# Patient Record
Sex: FEMALE | Race: WHITE | Employment: FULL TIME | URBAN - METROPOLITAN AREA
[De-identification: names, ages, dates, MRNs, and addresses within clinical notes are randomized per-mention and may not be internally consistent; named-entity substitution may affect disease eponyms.]

---

## 2017-02-08 LAB
CHLAMYDIA, EXTERNAL: NEGATIVE
HBSAG, EXTERNAL: NEGATIVE
HIV, EXTERNAL: NON REACTIVE
N. GONORRHEA, EXTERNAL: NEGATIVE
RUBELLA, EXTERNAL: NORMAL
TYPE, ABO & RH, EXTERNAL: NORMAL

## 2017-06-29 LAB — ANTIBODY SCREEN, EXTERNAL: NEGATIVE

## 2017-08-29 LAB — GRBS, EXTERNAL: NEGATIVE

## 2017-09-18 ENCOUNTER — ANESTHESIA EVENT (OUTPATIENT)
Dept: LABOR AND DELIVERY | Age: 32
End: 2017-09-18
Payer: COMMERCIAL

## 2017-09-18 NOTE — H&P
EDC:2017  EGA: 39 weeks, 5 days      Vital Signs   27Years Old Female  Weight: 191 pounds  BP:       130/80    Pap/HPV/Gardasil History   History of abnormal pap: yes  Gardasil Injection History: Not Applicable    Patient's Prenatal Care with Doctor of Record Kelsey Guzmán MD Notable For -    Previous IUGR pregnant, 3rd tri growth (36% at 26 weeks)  Prev LTCS, undecided re delivery mode,  consent __x__   lab screening  Normal pregnancy multigravida  MRSA Pregnant-1st Trim Cult_Positive, tx->NEG!, 3rd Trim Cult (POS), retest (NEG)  pap neg, hpv pos neg 16/18 in 3/2016, repeat 3/2017__              Past Pregnancy History      :  3     Term Births:  1     Premature Births: 0     Living Children: 1     Para:   1     Mult. Births:  0     Prev : 0     Aborta:  1     Elect. Ab:  0     Spont. Ab:  1     Ectopics:  0    Pregnancy # 1     Delivery date:   04/10/2014     Weeks Gestation: 37 4/7     Delivery type:   LTCS     Delivery location[facility]   [facility]     Infant Sex:  Female     Name:  Jennifer Bustos     Comments:  [address] [facility] for fetal intolerance to labor/non reassuring fetal surveillance, cervidil induction due to IUGR-->pitocin    Pregnancy # 2     Delivery type:   SAB     Comments:  early SAB, no procedures        Allergies    This patient has no known allergies. Medications Removed from Medication List        [address] [place] [address] for Follow-up Visit     Estimated weeks of        gestation:  39 5/7     Weight:  191     Blood pressure: 130 / 80     Urine Protein:  Tr     Urine Glucose: N     Headache:  No     Nausea/vomiting: No     Edema: TrLE     Vaginal bleeding: no     Vaginal discharge: no     Fetal activity:  yes     FHR:   150     Labor symptoms: no     Smoking:  n/a     Next visit:  1 d     Preceptor:  cpm     Flu:  accepted     Tim Bert:  denies exposure     Comment:  repeat mrsa neg! has decided on repeat c/s tomorrow.  flu vaccine today        Impression & Recommendations:    Problem # 1:  Prev LTCS, undecided re delivery mode,  consent __x__ (NUH-007.75) (MXS49-J39.211)  Has decided to proceed with repeat Low Transverse  Section  scheduled tomorrow  Reviewed risks of bleeding, infection, damage to surrounding structures including bowel, bladder, need for additional procedures, dvt. After discussion she agrees to proceed  Declines trial of labor  Consent signed and questions answered. NPO after midnight. Problem # 2:  MRSA Pregnant-1st Trim Cult_Positive, tx->NEG!, 3rd Trim Cult (POS), retest (NEG) (ZFA-262.66) (JWN50-K22.813)  repeat culture negative    Other Orders:  Antepartum Care (CPT-10)      Medications (at conclusion of this visit)    2017 CHLORHEXIDINE GLUCONATE 4 % EXT LIQD (CHLORHEXIDINE GLUCONATE) wash whole body with daily x 5-10 days  2017 MUPIROCIN 2 % OINT (MUPIROCIN) Apply to internal nares with a q-tip 3x daily for 1 week  2017 PRENATAL TABS (PRENATAL VIT-FE FUMARATE-FA TABS)           Primary Provider:  Frances Guevara MD    CC:  c/s. History of Present Illness:  28 yo  @ 39w5d presents for discussion of scheduled repeat c/s tomorrow. Initially desires  but now desires repeat c/s.  Scheduled tomorrow at 10am.  pregnancy c/b:  prior iugr with last pregnancy - normal efw 3rd tri  mrsa pos - neg most recently sp decolonization        Past Medical History:     Reviewed history from 2017 and no changes required:        Infertility        Elevated Prolactin - nml MRI, prior cabergoline    Past Surgical History:     Reviewed history from 2017 and no changes required:        Cyst removed on top of head 10+ years        [place]        LTCS, Female Dr. Giuseppe Morris  04/10/2014        moles removed    Family History Summary:      Reviewed history Last on 2017 and no changes required:2017  Mother Annette Coon) - Has Family History of Hypertension - Entered On: 3/23/2016  Father Annette Coon) - Has Family History of Other Medical Problems - Parkinson's Disease, onset 44yo - Entered On: 3/23/2016  PGF - Has Family History of Diabetes - Entered On: 3/23/2016    General Comments - FH:  Family history transferred to [address] [place] [address] And [address] [place] [address]     Social History:     Reviewed history from 2017 and no changes required:         - 2013 Nick Velasco)        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)        daughter Oujina Zuluaga        Half cat      Risk Factors:     Smoked Tobacco Use:  Former smoker     Cigarettes:  Yes -- n/a pack(s) per day,Smokeless Tobacco Use:  Never     Counseled to quit/cut down:  yes  Passive smoke exposure:  no  Drug use:  no  HIV high-risk behavior:  no  Caffeine use:  no drinks per day  Alcohol use:  yes     Type:  rarely  Exercise:  yes     Times per week:  7     Type of Exercise:  20 minute walk daily  Seatbelt use:  preg- %  Sun Exposure:  Frequently      Review of Systems        See HPI    Except as noted in the HPI, the review of systems is negative for General, Breast, , Resp, GI, Endo, MS, Psych and Heme. Vital Signs    Blood Pressure: 130 / 80    Weight:  191 pounds      Physical Exam     General           General appearance:  no acute distress    Head           Inspection:   normal    Extremeties           Extremeties:  0 edema    Psych           Orientation:  oriented to time, place, and person          Mood:  no appearance of anxiety, depression, or agitation    Abdomen           Abdomen:  gravid    Pelvic Exam           EGBUS:  deferred    Allergies    This patient has no known allergies.     Medications Removed from Medication List        Impression & Recommendations:    Problem # 1:  Prev LTCS, undecided re delivery mode,  consent __x__ (ZQP-550.97) (KUX79-E09.211)  Has decided to proceed with repeat Low Transverse  Section  scheduled tomorrow  Reviewed risks of bleeding, infection, damage to surrounding structures including bowel, bladder, need for additional procedures, dvt. After discussion she agrees to proceed  Declines trial of labor  Consent signed and questions answered. NPO after midnight. Problem # 2:  MRSA Pregnant-1st Trim Cult_Positive, tx->NEG!, 3rd Trim Cult (POS), retest (NEG) (KMV-755.76) (ZVL83-I26.813)  repeat culture negative    Other Orders:  Antepartum Care (CPT-10)      Medications (at conclusion of this visit)    09/05/2017 CHLORHEXIDINE GLUCONATE 4 % EXT LIQD (CHLORHEXIDINE GLUCONATE) wash whole body with daily x 5-10 days  09/05/2017 MUPIROCIN 2 % OINT (MUPIROCIN) Apply to internal nares with a q-tip 3x daily for 1 week  04/04/2017 PRENATAL TABS (PRENATAL VIT-FE FUMARATE-FA TABS)           LABORATORY DATA   TEST DATE RESULT   Group B Strep culture 08/29/2017 Negative                                   (Group B Strep Culture Result Field)   Blood Type 02/08/2017 AB                                             (Blood Type Result Field)   Rh 02/08/2017 Positive                                   (Rh Result Field)   Rhogam Inj Given 04/10/2014 *   Tdap Vaccine Given 06/29/2017 Vacc.  [address]   Antibody Screen 06/29/2017 Negative   Rubella  [facility] Reference Ranges On or After 3/10/14                  <0.90              Non-immune      0.90 - 0.99     Equivocal      >0.99              Immune    [facility] Reference Ranges  Before 3/10/14           <5                 Non-immune             5 - 9               Equivocal            >9                 Immune  [facility] Reference Ranges       < Or = 0.90       Negative             0.91-1.09          Equivocal            > Or = 1.10       Positive   02/08/2017     2.30     TPA (T Pallidum Antibodies) 06/29/2017 Negative   Serology (RPR) 04/10/2014 *   HBsAg 02/08/2017 Negative   HIV 02/08/2017 Non Reactive   Hemoglobin 06/29/2017 11.0   Hematocrit 06/29/2017 33.8   Platelets 51/30/6865 157 X10E3/UL   TSH 03/23/2016 1.440   Urine Culture 02/08/2017 Negative   GC DNA Probe 02/08/2017 Negative   Chlamydia DNA 02/08/2017 Negative   PAP 02/08/2017 NIL   Flu Vaccine Given 02/08/2017 vacc. elsewhere   HGBA1C 04/10/2014 *   HGB Electro     T4, Free 04/10/2014 *   BG Fasting 04/10/2014 *   GTT 1H 50G 06/29/2017 99   GTT 1H 100G 04/10/2014 *   GTT 2H 100G 04/10/2014 *   GTT 3H 100G 04/10/2014 *   Glucose Plasma 04/10/2014 *   CF Accept or Decline 10/22/2013 cf carrier testing-neg for 32 mutations   CF Screen Result 09/20/2013 Negative   Nuchal Trans 05/03/2017 4.25^4. 25 mm&millimeters   AFP Only 11/13/2013 *Screen Negative*   Tetra 04/04/2017 Declined   AFP Serum 04/10/2014 *   CVS 02/08/2017 declined   AFP Amniotic 04/10/2014 *   Amnio Karyo 02/08/2017 declined   FISH 04/10/2014 *   GC Culture 04/10/2014 *   Chlamydia Cult 04/10/2014 *   Ureaplasma     Mycoplasma     WBC 02/08/2017 11.5 X10E3/UL   RBC 02/08/2017 4.06 X10E6/UL   MCV 02/08/2017 90   MCH 02/08/2017 29.6   MCHC RBC 02/08/2017 32.8     ULTRASOUND DATA   TEST DATE RESULT   Estimated Fetal Weight 07/28/2017 [id number]^1914 g&grams                                     Weight % 07/28/2017 36^36% %&percent                                                LINCOLN 07/28/2017 30.58^67.8 cm&centimeters                    BPP 07/28/2017 8^8 [n/a]&Not applicable   Cervical Length (mm) 11/13/2013 31.000     ]      Electronically signed by Abi Quiroz MD on 09/18/2017 at 11:13 AM    ________________________________________________________________________

## 2017-09-19 ENCOUNTER — ANESTHESIA (OUTPATIENT)
Dept: LABOR AND DELIVERY | Age: 32
End: 2017-09-19
Payer: COMMERCIAL

## 2017-09-19 ENCOUNTER — HOSPITAL ENCOUNTER (INPATIENT)
Age: 32
LOS: 3 days | Discharge: HOME | End: 2017-09-22
Attending: OBSTETRICS & GYNECOLOGY | Admitting: OBSTETRICS & GYNECOLOGY
Payer: COMMERCIAL

## 2017-09-19 PROBLEM — Z98.891 HISTORY OF CESAREAN DELIVERY: Status: ACTIVE | Noted: 2017-09-19

## 2017-09-19 LAB
ABO + RH BLD: NORMAL
BLOOD GROUP ANTIBODIES SERPL: NORMAL
ERYTHROCYTE [DISTWIDTH] IN BLOOD BY AUTOMATED COUNT: 13.2 % (ref 11.5–14.5)
HCT VFR BLD AUTO: 34.7 % (ref 35–47)
HGB BLD-MCNC: 11.6 G/DL (ref 11.5–16)
MCH RBC QN AUTO: 28.6 PG (ref 26–34)
MCHC RBC AUTO-ENTMCNC: 33.4 G/DL (ref 30–36.5)
MCV RBC AUTO: 85.5 FL (ref 80–99)
PLATELET # BLD AUTO: 134 K/UL (ref 150–400)
RBC # BLD AUTO: 4.06 M/UL (ref 3.8–5.2)
SPECIMEN EXP DATE BLD: NORMAL
WBC # BLD AUTO: 8.4 K/UL (ref 3.6–11)

## 2017-09-19 PROCEDURE — 77030007866 HC KT SPN ANES BBMI -B: Performed by: ANESTHESIOLOGY

## 2017-09-19 PROCEDURE — 77030011640 HC PAD GRND REM COVD -A

## 2017-09-19 PROCEDURE — 74011250637 HC RX REV CODE- 250/637

## 2017-09-19 PROCEDURE — 76060000033 HC ANESTHESIA 1 TO 1.5 HR: Performed by: OBSTETRICS & GYNECOLOGY

## 2017-09-19 PROCEDURE — 85027 COMPLETE CBC AUTOMATED: CPT | Performed by: OBSTETRICS & GYNECOLOGY

## 2017-09-19 PROCEDURE — 74011250636 HC RX REV CODE- 250/636: Performed by: OBSTETRICS & GYNECOLOGY

## 2017-09-19 PROCEDURE — 76060000078 HC EPIDURAL ANESTHESIA: Performed by: OBSTETRICS & GYNECOLOGY

## 2017-09-19 PROCEDURE — 75410000003 HC RECOV DEL/VAG/CSECN EA 0.5 HR

## 2017-09-19 PROCEDURE — 77030018836 HC SOL IRR NACL ICUM -A

## 2017-09-19 PROCEDURE — 76010000392 HC C SECN EA ADDL 0.5 HR: Performed by: OBSTETRICS & GYNECOLOGY

## 2017-09-19 PROCEDURE — 65410000002 HC RM PRIVATE OB

## 2017-09-19 PROCEDURE — 76010000391 HC C SECN FIRST 1 HR: Performed by: OBSTETRICS & GYNECOLOGY

## 2017-09-19 PROCEDURE — 77030027138 HC INCENT SPIROMETER -A

## 2017-09-19 PROCEDURE — 75410000002 HC LABOR FEE PER 1 HR

## 2017-09-19 PROCEDURE — 10907ZC DRAINAGE OF AMNIOTIC FLUID, THERAPEUTIC FROM PRODUCTS OF CONCEPTION, VIA NATURAL OR ARTIFICIAL OPENING: ICD-10-PCS | Performed by: OBSTETRICS & GYNECOLOGY

## 2017-09-19 PROCEDURE — 77030031139 HC SUT VCRL2 J&J -A

## 2017-09-19 PROCEDURE — 74011250636 HC RX REV CODE- 250/636

## 2017-09-19 PROCEDURE — 77030018846 HC SOL IRR STRL H20 ICUM -A

## 2017-09-19 PROCEDURE — 77030002933 HC SUT MCRYL J&J -A

## 2017-09-19 PROCEDURE — 86900 BLOOD TYPING SEROLOGIC ABO: CPT | Performed by: OBSTETRICS & GYNECOLOGY

## 2017-09-19 PROCEDURE — 77030008459 HC STPLR SKN COOP -B

## 2017-09-19 PROCEDURE — 36415 COLL VENOUS BLD VENIPUNCTURE: CPT | Performed by: OBSTETRICS & GYNECOLOGY

## 2017-09-19 PROCEDURE — 77010026065 HC OXYGEN MINIMUM MEDICAL AIR

## 2017-09-19 PROCEDURE — 74011000250 HC RX REV CODE- 250

## 2017-09-19 PROCEDURE — 77030002974 HC SUT PLN J&J -A

## 2017-09-19 RX ORDER — MISOPROSTOL 100 UG/1
TABLET ORAL
Status: COMPLETED
Start: 2017-09-19 | End: 2017-09-19

## 2017-09-19 RX ORDER — OXYTOCIN/RINGER'S LACTATE 20/1000 ML
125-500 PLASTIC BAG, INJECTION (ML) INTRAVENOUS ONCE
Status: COMPLETED | OUTPATIENT
Start: 2017-09-19 | End: 2017-09-19

## 2017-09-19 RX ORDER — ACETAMINOPHEN 10 MG/ML
INJECTION, SOLUTION INTRAVENOUS AS NEEDED
Status: DISCONTINUED | OUTPATIENT
Start: 2017-09-19 | End: 2017-09-19 | Stop reason: HOSPADM

## 2017-09-19 RX ORDER — BUPIVACAINE HYDROCHLORIDE 7.5 MG/ML
INJECTION, SOLUTION EPIDURAL; RETROBULBAR AS NEEDED
Status: DISCONTINUED | OUTPATIENT
Start: 2017-09-19 | End: 2017-09-19 | Stop reason: HOSPADM

## 2017-09-19 RX ORDER — SODIUM CHLORIDE 0.9 % (FLUSH) 0.9 %
5-10 SYRINGE (ML) INJECTION EVERY 8 HOURS
Status: DISCONTINUED | OUTPATIENT
Start: 2017-09-19 | End: 2017-09-21

## 2017-09-19 RX ORDER — NALOXONE HYDROCHLORIDE 0.4 MG/ML
0.2 INJECTION, SOLUTION INTRAMUSCULAR; INTRAVENOUS; SUBCUTANEOUS
Status: DISCONTINUED | OUTPATIENT
Start: 2017-09-19 | End: 2017-09-22 | Stop reason: HOSPADM

## 2017-09-19 RX ORDER — NALOXONE HYDROCHLORIDE 0.4 MG/ML
0.4 INJECTION, SOLUTION INTRAMUSCULAR; INTRAVENOUS; SUBCUTANEOUS AS NEEDED
Status: DISCONTINUED | OUTPATIENT
Start: 2017-09-19 | End: 2017-09-22 | Stop reason: HOSPADM

## 2017-09-19 RX ORDER — DIPHENHYDRAMINE HYDROCHLORIDE 50 MG/ML
12.5 INJECTION, SOLUTION INTRAMUSCULAR; INTRAVENOUS
Status: DISCONTINUED | OUTPATIENT
Start: 2017-09-19 | End: 2017-09-22 | Stop reason: HOSPADM

## 2017-09-19 RX ORDER — SODIUM CHLORIDE 0.9 % (FLUSH) 0.9 %
5-10 SYRINGE (ML) INJECTION AS NEEDED
Status: DISCONTINUED | OUTPATIENT
Start: 2017-09-19 | End: 2017-09-19 | Stop reason: HOSPADM

## 2017-09-19 RX ORDER — OXYTOCIN 10 [USP'U]/ML
INJECTION, SOLUTION INTRAMUSCULAR; INTRAVENOUS AS NEEDED
Status: DISCONTINUED | OUTPATIENT
Start: 2017-09-19 | End: 2017-09-19 | Stop reason: HOSPADM

## 2017-09-19 RX ORDER — SODIUM CHLORIDE 0.9 % (FLUSH) 0.9 %
5-10 SYRINGE (ML) INJECTION EVERY 8 HOURS
Status: DISCONTINUED | OUTPATIENT
Start: 2017-09-19 | End: 2017-09-19 | Stop reason: HOSPADM

## 2017-09-19 RX ORDER — SIMETHICONE 80 MG
80 TABLET,CHEWABLE ORAL AS NEEDED
Status: DISCONTINUED | OUTPATIENT
Start: 2017-09-19 | End: 2017-09-22 | Stop reason: HOSPADM

## 2017-09-19 RX ORDER — ONDANSETRON 2 MG/ML
INJECTION INTRAMUSCULAR; INTRAVENOUS AS NEEDED
Status: DISCONTINUED | OUTPATIENT
Start: 2017-09-19 | End: 2017-09-19 | Stop reason: HOSPADM

## 2017-09-19 RX ORDER — OXYCODONE AND ACETAMINOPHEN 5; 325 MG/1; MG/1
1-2 TABLET ORAL
Status: DISCONTINUED | OUTPATIENT
Start: 2017-09-19 | End: 2017-09-22 | Stop reason: HOSPADM

## 2017-09-19 RX ORDER — KETOROLAC TROMETHAMINE 30 MG/ML
INJECTION, SOLUTION INTRAMUSCULAR; INTRAVENOUS AS NEEDED
Status: DISCONTINUED | OUTPATIENT
Start: 2017-09-19 | End: 2017-09-19 | Stop reason: HOSPADM

## 2017-09-19 RX ORDER — ONDANSETRON 2 MG/ML
4 INJECTION INTRAMUSCULAR; INTRAVENOUS
Status: DISCONTINUED | OUTPATIENT
Start: 2017-09-19 | End: 2017-09-22 | Stop reason: HOSPADM

## 2017-09-19 RX ORDER — KETOROLAC TROMETHAMINE 30 MG/ML
30 INJECTION, SOLUTION INTRAMUSCULAR; INTRAVENOUS
Status: DISPENSED | OUTPATIENT
Start: 2017-09-19 | End: 2017-09-20

## 2017-09-19 RX ORDER — SODIUM CHLORIDE, SODIUM LACTATE, POTASSIUM CHLORIDE, CALCIUM CHLORIDE 600; 310; 30; 20 MG/100ML; MG/100ML; MG/100ML; MG/100ML
125 INJECTION, SOLUTION INTRAVENOUS CONTINUOUS
Status: DISCONTINUED | OUTPATIENT
Start: 2017-09-19 | End: 2017-09-21

## 2017-09-19 RX ORDER — MISOPROSTOL 100 UG/1
800 TABLET ORAL ONCE
Status: COMPLETED | OUTPATIENT
Start: 2017-09-19 | End: 2017-09-19

## 2017-09-19 RX ORDER — SODIUM CHLORIDE 0.9 % (FLUSH) 0.9 %
5-10 SYRINGE (ML) INJECTION AS NEEDED
Status: DISCONTINUED | OUTPATIENT
Start: 2017-09-19 | End: 2017-09-22 | Stop reason: HOSPADM

## 2017-09-19 RX ORDER — PHENYLEPHRINE HCL IN 0.9% NACL 0.4MG/10ML
SYRINGE (ML) INTRAVENOUS AS NEEDED
Status: DISCONTINUED | OUTPATIENT
Start: 2017-09-19 | End: 2017-09-19 | Stop reason: HOSPADM

## 2017-09-19 RX ORDER — SODIUM CHLORIDE, SODIUM LACTATE, POTASSIUM CHLORIDE, CALCIUM CHLORIDE 600; 310; 30; 20 MG/100ML; MG/100ML; MG/100ML; MG/100ML
1000 INJECTION, SOLUTION INTRAVENOUS CONTINUOUS
Status: DISCONTINUED | OUTPATIENT
Start: 2017-09-19 | End: 2017-09-19 | Stop reason: HOSPADM

## 2017-09-19 RX ORDER — CEFAZOLIN SODIUM IN 0.9 % NACL 2 G/100 ML
PLASTIC BAG, INJECTION (ML) INTRAVENOUS
Status: COMPLETED
Start: 2017-09-19 | End: 2017-09-19

## 2017-09-19 RX ORDER — HYDROMORPHONE HYDROCHLORIDE 1 MG/ML
INJECTION, SOLUTION INTRAMUSCULAR; INTRAVENOUS; SUBCUTANEOUS AS NEEDED
Status: DISCONTINUED | OUTPATIENT
Start: 2017-09-19 | End: 2017-09-19 | Stop reason: HOSPADM

## 2017-09-19 RX ORDER — IBUPROFEN 400 MG/1
800 TABLET ORAL EVERY 8 HOURS
Status: DISCONTINUED | OUTPATIENT
Start: 2017-09-19 | End: 2017-09-22 | Stop reason: HOSPADM

## 2017-09-19 RX ORDER — DOCUSATE SODIUM 100 MG/1
100 CAPSULE, LIQUID FILLED ORAL 2 TIMES DAILY
Status: DISCONTINUED | OUTPATIENT
Start: 2017-09-19 | End: 2017-09-22 | Stop reason: HOSPADM

## 2017-09-19 RX ADMIN — OXYTOCIN 20 UNITS: 10 INJECTION, SOLUTION INTRAMUSCULAR; INTRAVENOUS at 10:43

## 2017-09-19 RX ADMIN — Medication 40 MCG: at 10:31

## 2017-09-19 RX ADMIN — ONDANSETRON 4 MG: 2 INJECTION INTRAMUSCULAR; INTRAVENOUS at 10:50

## 2017-09-19 RX ADMIN — Medication 40 MCG: at 10:33

## 2017-09-19 RX ADMIN — SODIUM CHLORIDE, SODIUM LACTATE, POTASSIUM CHLORIDE, AND CALCIUM CHLORIDE 125 ML/HR: 600; 310; 30; 20 INJECTION, SOLUTION INTRAVENOUS at 20:27

## 2017-09-19 RX ADMIN — BUPIVACAINE HYDROCHLORIDE 1.6 MG: 7.5 INJECTION, SOLUTION EPIDURAL; RETROBULBAR at 10:28

## 2017-09-19 RX ADMIN — SODIUM CHLORIDE, POTASSIUM CHLORIDE, SODIUM LACTATE AND CALCIUM CHLORIDE: 600; 310; 30; 20 INJECTION, SOLUTION INTRAVENOUS at 10:43

## 2017-09-19 RX ADMIN — CEFAZOLIN 2 G: 10 INJECTION, POWDER, FOR SOLUTION INTRAVENOUS; PARENTERAL at 09:56

## 2017-09-19 RX ADMIN — Medication 700 MCG: at 16:00

## 2017-09-19 RX ADMIN — HYDROMORPHONE HYDROCHLORIDE 0.5 MG: 1 INJECTION, SOLUTION INTRAMUSCULAR; INTRAVENOUS; SUBCUTANEOUS at 10:28

## 2017-09-19 RX ADMIN — KETOROLAC TROMETHAMINE 30 MG: 30 INJECTION, SOLUTION INTRAMUSCULAR; INTRAVENOUS at 10:55

## 2017-09-19 RX ADMIN — SODIUM CHLORIDE, SODIUM LACTATE, POTASSIUM CHLORIDE, AND CALCIUM CHLORIDE 125 ML/HR: 600; 310; 30; 20 INJECTION, SOLUTION INTRAVENOUS at 09:56

## 2017-09-19 RX ADMIN — Medication 40 MCG: at 10:30

## 2017-09-19 RX ADMIN — ACETAMINOPHEN 1000 MG: 10 INJECTION, SOLUTION INTRAVENOUS at 10:50

## 2017-09-19 RX ADMIN — MISOPROSTOL 700 MCG: 100 TABLET ORAL at 16:00

## 2017-09-19 RX ADMIN — SODIUM CHLORIDE, SODIUM LACTATE, POTASSIUM CHLORIDE, AND CALCIUM CHLORIDE 1000 ML: 600; 310; 30; 20 INJECTION, SOLUTION INTRAVENOUS at 09:15

## 2017-09-19 RX ADMIN — KETOROLAC TROMETHAMINE 30 MG: 30 INJECTION, SOLUTION INTRAMUSCULAR at 19:34

## 2017-09-19 RX ADMIN — SODIUM CHLORIDE, POTASSIUM CHLORIDE, SODIUM LACTATE AND CALCIUM CHLORIDE: 600; 310; 30; 20 INJECTION, SOLUTION INTRAVENOUS at 10:16

## 2017-09-19 RX ADMIN — Medication 2500 MILLI-UNITS/HR: at 12:36

## 2017-09-19 NOTE — LACTATION NOTE
This note was copied from a baby's chart. Infant to breast twice and nursing well. Mom pumped with first child for 10 months due to difficulty latching. Encouraged continued frequency with feeding. Infant observed awake, strong suck and latch score of 9. Dad assisting to breast. Encouraged responding to feeding cues as well as waking to feed every 2 to 3 hours if sleepy. Mom given lanolin for prevention of nipple tenderness. Mother has new double electric pump. Encouraged asking for assistance as needed post .

## 2017-09-19 NOTE — IP AVS SNAPSHOT
Summary of Care Report The Summary of Care report has been created to help improve care coordination. Users with access to [employer] or [facility] (Web-based application) may access additional patient information including the Discharge Summary. If you are not currently a [address] user and need more information, please call the number listed below in the [place] [address] section and ask to be connected with Medical Records. Facility Information Name Address Phone [address] [address] [phone] Patient Information Patient Name Sex  Cardenas Lenny ([id number]) Female 1985 Discharge Information Admitting Provider Service Area Unit Haley Fajardo MD / Nita Falcon [address] Mother Infant / [phone] Discharge Provider Discharge Date/Time Discharge Disposition Destination (none) 2017 Morning (Pending) AHR (none) Patient Language Language ENGLISH [13] Hospital Problems as of 2017  Reviewed: 2017  9:22 AM by Evert Joshi MD  
  
  
  
 Class Noted - Resolved Last Modified POA Active Problems History of  delivery  2017 - Present 2017 by Haley Fajardo MD Unknown Entered by Haley Fajardo MD  
  
Non-Hospital Problems as of 2017  Reviewed: 2017  9:22 AM by Evert Joshi MD  
  
  
  
 Class Noted - Resolved Last Modified Active Problems IUGR (intrauterine growth restriction)  2014 - Present 2014 Entered by Pennie Naqvi MD  
  Unfavorable cervix in term pregnancy  4/10/2014 - Present 2014 Entered by Bro Alvarez MD  
  
You are allergic to the following No active allergies Current Discharge Medication List  
  
START taking these medications Dose & Instructions Dispensing Information Comments  
 docusate sodium 100 mg capsule Commonly known as:  Natasha Sargent Dose:  100 mg Take 1 Cap by mouth two (2) times a day for 30 doses. Quantity:  30 Cap Refills:  0  
   
 ferrous sulfate 325 mg (65 mg iron) tablet Dose:  325 mg Take 1 Tab by mouth two (2) times daily (with meals). Quantity:  60 Tab Refills:  3 HYDROcodone-acetaminophen 5-325 mg per tablet Commonly known as:  Jaimee Iron Dose:  1 Tab Take 1 Tab by mouth every four (4) hours as needed for Pain. Max Daily Amount: 6 Tabs. Quantity:  30 Tab Refills:  0  
   
 ibuprofen 600 mg tablet Commonly known as:  MOTRIN Dose:  600 mg Take 1 Tab by mouth every six (6) hours as needed for Pain. Quantity:  30 Tab Refills:  1 CONTINUE these medications which have NOT CHANGED Dose & Instructions Dispensing Information Comments PRENATAL DHA+COMPLETE PRENATAL 30975-300 mg-mcg-mg Cmpk Generic drug:  [id number] sissy-folic-dha Take  by mouth. Refills:  0 Current Immunizations Name Date Influenza Vaccine 2017 Surgery Information ID Date/Time Status Primary Surgeon All Procedures Location [id number] 2017 [address] L&D OR Follow-up Information Follow up With Details Comments Contact Sanjay Cardenas 7 [phone] Discharge Instructions  Delivery (Postpartum Care): After Your Visit Your Care Instructions Your baby was delivered through a cut, called an incision, in your belly. This surgery is called a  delivery, or a . After childbirth (postpartum period), your body goes through many changes.  In the next few weeks, your body will slowly heal. It can take 4 weeks or more for the incision from your surgery to heal. It is easy to get too tired and overwhelmed during the first weeks after your baby is born. You may feel emotional during this time. Changes in your hormones can shift your mood without warning. It is easy to get too tired and overwhelmed during the first weeks after childbirth. Take it easy on yourself. You may find it hard to meet the extra demands on your energy and time. Get rest whenever you can, accept help from others, and eat well and drink plenty of fluids. After a , you may have gas or need to burp a lot. You may have some light vaginal bleeding or spotting for up to 6 weeks after surgery. It is normal to have pain in the incision area off and on during the next 6 months. No driving for 1 week after delivery. No heavy lifting. No more than 8 lbs or the size of baby for 2-3 weeks. Limit use of stairs. 1-2 times per day for the first week after delivery. Follow-up care is a key part of your treatment and safety. Be sure to make and go to all appointments, and call your doctor if you are having problems. Its also a good idea to know your test results and keep a list of the medicines you take. Around 4 to 6 weeks after your baby's birth, you will have a follow-up visit with your doctor. This visit is your time to talk to your doctor about anything you are concerned or curious about. Keep a list of questions to bring to your postpartum visit. Your questions might be about: 
Changes in your breasts, such as lumps or soreness. When to expect your menstrual period to start again. What form of birth control is best for you. Weight you have put on during the pregnancy. Exercise options. What foods and drinks are best for you, especially if you are breast-feeding. Problems you might be having with breast-feeding. When you can have sex. Some women may want to talk about lubricants for the vagina. Any feelings of sadness or restlessness that you are having. How can you care for yourself at home? Be sure that you understand any instructions your doctor has given you after surgery. This will guide your activities and tell you what to watch for in the next few weeks. Vaginal bleeding and cramps After delivery, you will have a bloody discharge from the vagina. This will turn pink within a week and then white or yellow after about 10 days. It may last for 2 to 4 weeks or longer, until the uterus has healed. You may have cramps for the first few days after childbirth. These are normal and occur as the uterus shrinks to normal size. Take an over-the-counter pain medicine, such as acetaminophen (Tylenol), ibuprofen (Advil, Motrin), or naproxen (Aleve), for cramps. Read and follow all instructions on the label. Do not take aspirin, because it can cause more bleeding. Take other medicines exactly as prescribed. Call your doctor if you have any problems with your medicine. Incision You may have had staples removed while you were in the hospital. Keep the area clean, and wash it with water and mild soap. If you had stitches, they should dissolve on their own and should not need to be removed. Follow your doctor's instructions for cleaning the stitched area. If you have steri-strips (tape) the will come off on their own in 7-10 days. Breast-feeding and breast fullness Breast-feed your baby in positions that do not put pressure on your incision, such as side-lying or football-hold positions. Ask your nurse, doctor, midwife, or lactation specialist to show you these positions if you do not know what they are. Your breasts may overfill (engorge) in the first few days after delivery. To help milk flow and to relieve pain, warm your breasts in the shower or by using warm, moist towels before nursing. Express a small amount of milk to soften your breast near the nipple and then feed your baby.  Put an ice or cold pack on your breast for a few minutes after nursing to reduce swelling and pain. Put a thin cloth between the ice pack and your skin. If you are not nursing, do not put warmth on your breasts or touch your breasts. Wear a tight bra or sports bra, and use an ice or cold pack on your breasts to reduce swelling and pain. Breast fullness usually lasts 3 to 4 days. Activity Eat a balanced diet. Do not try to lose weight by cutting calories. Keep taking your prenatal vitamins, or take a multivitamin. Get as much rest as you can. Try to take naps when your baby sleeps during the day. Get help with household chores from family or friends, if you can. Do not try to do it all yourself. Get some gentle exercise, such as walking, every day. Do not lift more than 10 pounds for the next 4 to 6 weeks. Do not have sex or use tampons until you have stopped bleeding and at least 4 to 6 weeks have passed since you gave birth. Talk to your doctor about birth control. You can get pregnant even before your period returns. Also, you can get pregnant while you are breast-feeding. Mental health It is normal to have some sadness, anxiety, sleeplessness, and mood swings after you go home. If you feel upset or hopeless for more than a few days, talk to your doctor. If you have any thoughts of hurting yourself or anyone elseincluding your babycall 911 or go to the emergency room. Many women get the \"baby blues\" during the first few days after childbirth. The \"baby blues\" usually peak around the fourth day and then ease up in less than 2 weeks. If you have the \"baby blues\" for more than a few days, or if you have thoughts of hurting yourself or your baby, call your doctor right away. Constipation and hemorrhoids Drink plenty of fluids (8 to 10 glasses a day), especially water. · Eat plenty of fiber each day to avoid constipation. Include foods such as whole-grain breads and cereals, raw vegetables, raw and dried fruits, and beans. · If you have hemorrhoids or swelling or pain around the opening of your vagina, try using cold and heat. You can put ice or a cold pack on the area for 10 to 20 minutes at a time. Put a thin cloth between the ice and your skin. Also try sitting in a few inches of warm water (sitz bath) 3 times a day and after bowel movements. Drink plenty of fluids, enough so that your urine is light yellow or clear like water. If you have kidney, heart, or liver disease and have to limit fluids, talk with your doctor before you increase the amount of fluids you drink. When should you call for help? Call 911 anytime you think you may need emergency care. For example, call if: 
You are thinking of hurting yourself, your baby, or anyone else. You have sudden, severe pain in your belly. You pass out (lose consciousness). Call your doctor now or seek immediate medical care if: 
You have severe vaginal bleeding. You are passing blood clots and soaking through a pad each hour for 2 or more hours. Your vaginal bleeding seems to be getting heavier or is still bright red 4 days after delivery, or you pass blood clots larger than the size of a golf ball. You have increased redness, heat, or drainage in the incision area. You are dizzy or lightheaded, or you feel like you may faint. You are vomiting or cannot keep fluids down. You have a fever. You have new or more belly pain. You pass tissue (not just blood). The incision seems to be pulling open or starts bleeding. Your vaginal discharge smells bad. Your belly feels more tender or full and hard. You have pain or redness in one or both breasts. You feel sad, anxious, or hopeless for more than a few days. Where can you learn more? Go to [employer].be Enter H408 in the search box to learn more about \" Delivery: After Your Visit\". or 
  
Go to [employer]be Enter Y429  in the search box to learn more about \"Postpartum Care: After Your Visit\". This care instruction is for use with your licensed healthcare professional. If you have questions about a medical condition or this instruction, always ask your healthcare professional. Care instructions adapted by Mandy Garcia (which disclaims liability or warranty for this information) from [employer] [place], [address] [place] [address] . [facility] disclaims any warranty or liability for your use of this information. LocalBonushart Activation Thank you for requesting access to AT Internet. Please follow the instructions below to securely access and download your online medical record. AT Internet allows you to send messages to your doctor, view your test results, renew your prescriptions, schedule appointments, and more. How Do I Sign Up? 1. In your internet browser, go to [email or web address]. 2. Click on the First Time User? Click Here link in the Sign In box. You will see the New Member Sign Up page. 3. Enter your AT Internet Access Code exactly as it appears below. You will not need to use this code after youve completed the sign-up process. If you do not sign up before the expiration date, you must request a new code. AT Internet Access Code: Activation code not generated Current AT Internet Status: Active (This is the date your AT Internet access code will ) 4. Enter the last four digits of your Social Security Number (xxxx) and Date of Birth (mm/dd/yyyy) as indicated and click Submit. You will be taken to the next sign-up page. 5. Create a Sookboxt ID. This will be your AT Internet login ID and cannot be changed, so think of one that is secure and easy to remember. 6. Create a AT Internet password. You can change your password at any time. 7. Enter your Password Reset Question and Answer. This can be used at a later time if you forget your password. 8. Enter your e-mail address.  You will receive e-mail notification when new information is available in [facility] [employer]. 9. Click Sign Up. You can now view and download portions of your medical record. 10. Click the Download Summary menu link to download a portable copy of your medical information. Additional Information If you have questions, please visit the Frequently Asked Questions section of the [facility] website at https://Official Limited Virtual. CoffeeTable. [email or web address]/[email or web address] Couturehart/. Remember, [facility] is NOT to be used for urgent needs. For medical emergencies, dial 911. Chart Review Routing History Recipient Method Report Sent By Mollie Piedra Claudette Pax, MD  
Fax: [phone] Phone: [phone] Fax Ki Man MD NOTES AUTO ROUTING REPORT Jose Waller MD [[id number]] 4/9/2014  8:50 PM 04/09/2014 Claudette Pax, MD  
Fax: [phone] Phone: [phone] Fax Ki Man MD NOTES AUTO ROUTING REPORT Nichol Mott MD [[id number]] 4/13/2014  7:47 AM 04/13/2014 Claudette Pax, MD  
Fax: [phone] Phone: [phone] Fax Ki Man MD NOTES AUTO ROUTING REPORT Quinn Chiu MD [[id number]] 9/18/2017 11:13 AM 09/18/2017

## 2017-09-19 NOTE — ROUTINE PROCESS
0865- Pt in for scheduled repeat C/S for 10am. Pt is A1 39/6 weeks pt of Dr Diaz's. Pt admitted and consents signed, IV site established. CBC and T&S sent to lab. Shave prep done. 0930- Dr Lakeisha Domingo in . POC discussed with pt. Pt verbalized understanding. 1015- Pt  To OR.  1043- Pt had a viable Female infant at 46 by C/S without any complications. 1125- Pt transferred to room 3309 for recovery. 1230- Report to Alison Turner given. 12- Dr Lakeisha Domingo was notified of pt's low heart rate of 48-61/min and recent high BP changes. Asymptomatic. No new orders.

## 2017-09-19 NOTE — IP AVS SNAPSHOT
[address] [facility] 
[phone] Patient: Scarlet Sanchez MRN: [id number] IJT: You are allergic to the following No active allergies Recent Documentation Height Weight Breastfeeding? BMI OB Status Smoking Status 1.664 m 86.6 kg Yes 31.3 kg/m2 Recent pregnancy Never Smoker Emergency Contacts Name Discharge Info Relation Home Work Mobile [address] [employer] CAREGIVER [3] Spouse [3] [phone] [address]  Parent [1] [phone] About your hospitalization You were admitted on:  2017 You last received care in the:  [facility] You were discharged on:  2017 Unit phone number:  [phone] Why you were hospitalized Your primary diagnosis was:  Not on File Your diagnoses also included:  History Of  Delivery Providers Seen During Your Hospitalizations Provider Role Specialty Primary office phone Felisa Vargas MD Attending Provider Obstetrics & Gynecology [phone] Your Primary Care Physician (PCP) Primary Care Physician Office Phone Office Fax Karen Lamas [phone] Follow-up Information Follow up With Details Comments Contact Info Sanjay [place] [facility] [phone] Current Discharge Medication List  
  
START taking these medications Dose & Instructions Dispensing Information Comments Morning Noon Evening Bedtime  
 docusate sodium 100 mg capsule Commonly known as:  Taylor Ivy Your last dose was: Your next dose is:    
   
   
 Dose:  100 mg Take 1 Cap by mouth two (2) times a day for 30 doses. Quantity:  30 Cap Refills:  0  
     
   
   
   
  
 ferrous sulfate 325 mg (65 mg iron) tablet Your last dose was: Your next dose is:    
   
   
 Dose:  325 mg Take 1 Tab by mouth two (2) times daily (with meals). Quantity:  60 Tab Refills:  3 HYDROcodone-acetaminophen 5-325 mg per tablet Commonly known as:  Keisha Crocker Your last dose was: Your next dose is:    
   
   
 Dose:  1 Tab Take 1 Tab by mouth every four (4) hours as needed for Pain. Max Daily Amount: 6 Tabs. Quantity:  30 Tab Refills:  0  
     
   
   
   
  
 ibuprofen 600 mg tablet Commonly known as:  MOTRIN Your last dose was: Your next dose is:    
   
   
 Dose:  600 mg Take 1 Tab by mouth every six (6) hours as needed for Pain. Quantity:  30 Tab Refills:  1 CONTINUE these medications which have NOT CHANGED Dose & Instructions Dispensing Information Comments Morning Noon Evening Bedtime PRENATAL DHA+COMPLETE PRENATAL -300 mg-mcg-mg Cmpk Generic drug:  [id number] sissy-folic-dha Your last dose was: Your next dose is: Take  by mouth. Refills:  0 Where to Get Your Medications Information on where to get these meds will be given to you by the nurse or doctor. ! Ask your nurse or doctor about these medications  
  docusate sodium 100 mg capsule  
 ferrous sulfate 325 mg (65 mg iron) tablet HYDROcodone-acetaminophen 5-325 mg per tablet  
 ibuprofen 600 mg tablet Discharge Instructions  Delivery (Postpartum Care): After Your Visit Your Care Instructions Your baby was delivered through a cut, called an incision, in your belly. This surgery is called a  delivery, or a . After childbirth (postpartum period), your body goes through many changes.  In the next few weeks, your body will slowly heal. It can take 4 weeks or more for the incision from your surgery to heal. It is easy to get too tired and overwhelmed during the first weeks after your baby is born. You may feel emotional during this time. Changes in your hormones can shift your mood without warning. It is easy to get too tired and overwhelmed during the first weeks after childbirth. Take it easy on yourself. You may find it hard to meet the extra demands on your energy and time. Get rest whenever you can, accept help from others, and eat well and drink plenty of fluids. After a , you may have gas or need to burp a lot. You may have some light vaginal bleeding or spotting for up to 6 weeks after surgery. It is normal to have pain in the incision area off and on during the next 6 months. No driving for 1 week after delivery. No heavy lifting. No more than 8 lbs or the size of baby for 2-3 weeks. Limit use of stairs. 1-2 times per day for the first week after delivery. Follow-up care is a key part of your treatment and safety. Be sure to make and go to all appointments, and call your doctor if you are having problems. Its also a good idea to know your test results and keep a list of the medicines you take. Around 4 to 6 weeks after your baby's birth, you will have a follow-up visit with your doctor. This visit is your time to talk to your doctor about anything you are concerned or curious about. Keep a list of questions to bring to your postpartum visit. Your questions might be about: 
Changes in your breasts, such as lumps or soreness. When to expect your menstrual period to start again. What form of birth control is best for you. Weight you have put on during the pregnancy. Exercise options. What foods and drinks are best for you, especially if you are breast-feeding. Problems you might be having with breast-feeding. When you can have sex. Some women may want to talk about lubricants for the vagina. Any feelings of sadness or restlessness that you are having. How can you care for yourself at home? Be sure that you understand any instructions your doctor has given you after surgery. This will guide your activities and tell you what to watch for in the next few weeks. Vaginal bleeding and cramps After delivery, you will have a bloody discharge from the vagina. This will turn pink within a week and then white or yellow after about 10 days. It may last for 2 to 4 weeks or longer, until the uterus has healed. You may have cramps for the first few days after childbirth. These are normal and occur as the uterus shrinks to normal size. Take an over-the-counter pain medicine, such as acetaminophen (Tylenol), ibuprofen (Advil, Motrin), or naproxen (Aleve), for cramps. Read and follow all instructions on the label. Do not take aspirin, because it can cause more bleeding. Take other medicines exactly as prescribed. Call your doctor if you have any problems with your medicine. Incision You may have had staples removed while you were in the hospital. Keep the area clean, and wash it with water and mild soap. If you had stitches, they should dissolve on their own and should not need to be removed. Follow your doctor's instructions for cleaning the stitched area. If you have steri-strips (tape) the will come off on their own in 7-10 days. Breast-feeding and breast fullness Breast-feed your baby in positions that do not put pressure on your incision, such as side-lying or football-hold positions. Ask your nurse, doctor, midwife, or lactation specialist to show you these positions if you do not know what they are. Your breasts may overfill (engorge) in the first few days after delivery. To help milk flow and to relieve pain, warm your breasts in the shower or by using warm, moist towels before nursing. Express a small amount of milk to soften your breast near the nipple and then feed your baby.  Put an ice or cold pack on your breast for a few minutes after nursing to reduce swelling and pain. Put a thin cloth between the ice pack and your skin. If you are not nursing, do not put warmth on your breasts or touch your breasts. Wear a tight bra or sports bra, and use an ice or cold pack on your breasts to reduce swelling and pain. Breast fullness usually lasts 3 to 4 days. Activity Eat a balanced diet. Do not try to lose weight by cutting calories. Keep taking your prenatal vitamins, or take a multivitamin. Get as much rest as you can. Try to take naps when your baby sleeps during the day. Get help with household chores from family or friends, if you can. Do not try to do it all yourself. Get some gentle exercise, such as walking, every day. Do not lift more than 10 pounds for the next 4 to 6 weeks. Do not have sex or use tampons until you have stopped bleeding and at least 4 to 6 weeks have passed since you gave birth. Talk to your doctor about birth control. You can get pregnant even before your period returns. Also, you can get pregnant while you are breast-feeding. Mental health It is normal to have some sadness, anxiety, sleeplessness, and mood swings after you go home. If you feel upset or hopeless for more than a few days, talk to your doctor. If you have any thoughts of hurting yourself or anyone elseincluding your babycall 911 or go to the emergency room. Many women get the \"baby blues\" during the first few days after childbirth. The \"baby blues\" usually peak around the fourth day and then ease up in less than 2 weeks. If you have the \"baby blues\" for more than a few days, or if you have thoughts of hurting yourself or your baby, call your doctor right away. Constipation and hemorrhoids Drink plenty of fluids (8 to 10 glasses a day), especially water. · Eat plenty of fiber each day to avoid constipation. Include foods such as whole-grain breads and cereals, raw vegetables, raw and dried fruits, and beans. · If you have hemorrhoids or swelling or pain around the opening of your vagina, try using cold and heat. You can put ice or a cold pack on the area for 10 to 20 minutes at a time. Put a thin cloth between the ice and your skin. Also try sitting in a few inches of warm water (sitz bath) 3 times a day and after bowel movements. Drink plenty of fluids, enough so that your urine is light yellow or clear like water. If you have kidney, heart, or liver disease and have to limit fluids, talk with your doctor before you increase the amount of fluids you drink. When should you call for help? Call 911 anytime you think you may need emergency care. For example, call if: 
You are thinking of hurting yourself, your baby, or anyone else. You have sudden, severe pain in your belly. You pass out (lose consciousness). Call your doctor now or seek immediate medical care if: 
You have severe vaginal bleeding. You are passing blood clots and soaking through a pad each hour for 2 or more hours. Your vaginal bleeding seems to be getting heavier or is still bright red 4 days after delivery, or you pass blood clots larger than the size of a golf ball. You have increased redness, heat, or drainage in the incision area. You are dizzy or lightheaded, or you feel like you may faint. You are vomiting or cannot keep fluids down. You have a fever. You have new or more belly pain. You pass tissue (not just blood). The incision seems to be pulling open or starts bleeding. Your vaginal discharge smells bad. Your belly feels more tender or full and hard. You have pain or redness in one or both breasts. You feel sad, anxious, or hopeless for more than a few days. Where can you learn more? Go to Boxever.be Enter N136 in the search box to learn more about \" Delivery: After Your Visit\". or 
  
Go to Boxever.be Enter G967  in the search box to learn more about \"Postpartum Care: After Your Visit\". This care instruction is for use with your licensed healthcare professional. If you have questions about a medical condition or this instruction, always ask your healthcare professional. Care instructions adapted by [employer] (which disclaims liability or warranty for this information) from Cedric Faria, [address] [place] [address] 2008. [facility] disclaims any warranty or liability for your use of this information. CollegeBrainhart Activation Thank you for requesting access to Udacity. Please follow the instructions below to securely access and download your online medical record. Udacity allows you to send messages to your doctor, view your test results, renew your prescriptions, schedule appointments, and more. How Do I Sign Up? 1. In your internet browser, go to [email or web address]. 2. Click on the First Time User? Click Here link in the Sign In box. You will see the New Member Sign Up page. 3. Enter your Udacity Access Code exactly as it appears below. You will not need to use this code after youve completed the sign-up process. If you do not sign up before the expiration date, you must request a new code. Udacity Access Code: Activation code not generated Current Udacity Status: Active (This is the date your Udacity access code will ) 4. Enter the last four digits of your Social Security Number (xxxx) and Date of Birth (mm/dd/yyyy) as indicated and click Submit. You will be taken to the next sign-up page. 5. Create a PsyQict ID. This will be your Udacity login ID and cannot be changed, so think of one that is secure and easy to remember. 6. Create a Udacity password. You can change your password at any time. 7. Enter your Password Reset Question and Answer. This can be used at a later time if you forget your password. 8. Enter your e-mail address.  You will receive e-mail notification when new information is available in Koinify. 9. Click Sign Up. You can now view and download portions of your medical record. 10. Click the Download Summary menu link to download a portable copy of your medical information. Additional Information If you have questions, please visit the Frequently Asked Questions section of the Koinify website at [email or web address] Remember, MyChart is NOT to be used for urgent needs. For medical emergencies, dial 911. Discharge Orders None [facility]! Dear Alex Casas: Thank you for requesting a Koinify account. Our records indicate that you already have an active Koinify account. You can access your account anytime at [email or web address] Did you know that you can access your hospital and ER discharge instructions at any time in Koinify? You can also review all of your test results from your hospital stay or ER visit. Additional Information If you have questions, please visit the Frequently Asked Questions section of the Koinify website at [email or web address] Remember, MyChart is NOT to be used for urgent needs. For medical emergencies, dial 911. Now available from your iPhone and Android! General Information Please provide this summary of care documentation to your next provider. Patient Signature:  ____________________________________________________________ Date:  ____________________________________________________________  
  
Carrie Pickens Provider Signature:  ____________________________________________________________ Date:  ____________________________________________________________

## 2017-09-19 NOTE — PROGRESS NOTES
Bedside shift change report given to KVNG RN (oncoming nurse) by FREEMAN Jerome (offgoing nurse). Report given with SBAR.

## 2017-09-19 NOTE — ANESTHESIA POSTPROCEDURE EVALUATION
Post-Anesthesia Evaluation and Assessment    Patient: Fara Alexander MRN: [id number]  SSN: [id number]    YOB: 1985  Age: 27 y.o. Sex: female       Cardiovascular Function/Vital Signs  Visit Vitals    /88    Pulse 91    Temp 36.5 °C (97.7 °F)    Resp 16    Ht 5' 5.5\" (1.664 m)    Wt 86.6 kg (191 lb)    SpO2 100%    Breastfeeding Yes    BMI 31.3 kg/m2       Patient is status post spinal anesthesia for Procedure(s):   SECTION. Nausea/Vomiting: None    Postoperative hydration reviewed and adequate. Pain:  Pain Scale 1: Numeric (0 - 10) (17 1200)  Pain Intensity 1: 0 (17 1200)   Managed    Neurological Status:   Neuro (WDL): Within Defined Limits (17 1200)   At baseline    Mental Status and Level of Consciousness: Arousable    Pulmonary Status:   O2 Device: Room air (17 1200)   Adequate oxygenation and airway patent    Complications related to anesthesia: None    Post-anesthesia assessment completed.  No concerns    Signed By: Kobe Weber MD     2017

## 2017-09-19 NOTE — OP NOTES
Operative Note    Name: Fiorella Barron Record Number: [id number]   YOB: 1985  Today's Date: 2017      Pre-operative Diagnosis: IUP at 39wks, prior c/s, desires repeat    Post-operative Diagnosis: Same    Operation: Repeat LTCS via pfannensteil    Surgeon(s):  MD Quinn Argueta MD    Anesthesia: Spinal    Prophylactic Antibiotics: Ancef  DVT Prophylaxis: Sequential Compression Devices         Fetal Description: luque     Birth Information:   Information for the patient's :  Radha Query [[id number]]   Delivery of a 3.375 kg Female [1] infant on 2017 at 10:43 AM. Apgars were 8 and 9. Umbilical Cord:     Umbilical Cord Events:     Placenta:  removal with  appearance. Amniotic Fluid Volume: Moderate     Amniotic Fluid Description:  Clear          Placenta:  Expressed    Estimated Blood Loss (ml):      Specimens: none           Complications:  None    Procedure Detail:      After proper patient identification and consent, the patient was taken to the operating room, where spinal anesthesia was administered and found to be adequate. Dorado catheter had been placed using sterile technique. The patient was prepped and draped in the normal sterile fashion. A low transverse skin incision was made and carried down to the underlying fascia. The fascia was nicked in the midline and the fascial incision was then extended sharply. The superior edge of the fascial incision was then grasped and the underlying rectus muscles dissected off bluntly. There were minimal adhesions of the rectus to the fascia. The peritoneum was then identified and entered bluntly and this incision was extended with stretch. There were no intra-abdominal adhesions noted. A bladder flap was created. A low transverse uterine incision was made with the scalpel  and extended with blunt finger dissection.  Amniotomy was performed and the fluid was medium amount clear.  The babys head was then delivered atraumatically. The cord was clamped and cut and the baby was handed off to Nursing staff in attendance. Placenta was then removed from the uterus. The uterus was curettaged with a moist lap pad and cleared of all clots and debris. The uterine incision was closed with 2-0 monocryl  in running locking fashion, followed by a second imbricating layer of 2-0 monocryl running stitch. Good hemostasis was assured. The rectus muscles were then reapproximated with 2-0 vicryl. The fascia was closed with 0- Vicryl in a running fashion. Good hemostasis was assured. The subcutaneous tissue was closed with 2-0 plain gut. The skin was closed with Insorb subcuticular staple closure. The patient tolerated the procedure well. Sponge, lap, and needle counts were correct times three and the patient and baby were taken to recovery/postpartum room in stable condition.     Milli Hurd MD  September 19, 2017  1:49 PM

## 2017-09-19 NOTE — LACTATION NOTE
This note was copied from a baby's chart. Discussed with mother her plan for feeding. Reviewed the benefits of exclusive breast milk feeding during the hospital stay. Informed mother of the risks of using formula to supplement in the first few days of life as well as the benefits of successful breast milk feeding; referred mother to the handout in her admission packet related to these topics. Mother acknowledges understanding of information reviewed and states that it is her plan to breast milk and formula feed her infant. Will support her choice and offer additional information as needed.

## 2017-09-19 NOTE — L&D DELIVERY NOTE
Delivery Summary  Patient: Joao Rivera             Circumcision:   NA-female  Additional Delivery Comments - Repeat c/s at 39 weeks, uncomplicated. Girl, \"ita\"      Information for the patient's :  Rilla Jeans [[id number]]       Labor Events:    Labor: No   Rupture Date: 2017   Rupture Time: 10:42 AM   Rupture Type AROM   Amniotic Fluid Volume: Moderate    Amniotic Fluid Description: Clear None   Induction: None       Augmentation: None   Labor Events:       Cervical Ripening:     None     Delivery Events:  Episiotomy:     Laceration(s):       Repaired:      Number of Repair Packets:     Suture Type and Size:       Estimated Blood Loss (ml):  ml       Delivery Date: 2017    Delivery Time: 10:43 AM  Delivery Type: , Low Transverse  Sex:  Female     Gestational Age: 37w11d   Delivery Clinician:  Alley Oliveira  Living Status: Living   Delivery Location: OR            APGARS  One minute Five minutes Ten minutes   Skin color: 0   1        Heart rate: 2   2        Grimace: 2   2        Muscle tone: 2   2        Breathin   2        Totals: 8   9            Presentation: Vertex    Position:        Resuscitation Method:  Suctioning-bulb; Tactile Stimulation     Meconium Stained:        Cord Vessels: 3 Vessels      Cord Events:    Cord Blood Sent?:  No    Blood Gases Sent?:  No    Placenta:  Date/Time:  10:44 AM  Removal:        Appearance: Normal      Measurements:  Birth Weight: 3.375 kg      Birth Length: 50.8 cm      Head Circumference: 37 cm      Chest Circumference: 33 cm     Abdominal Girth: 31.5 cm    Other Providers:   ;WALLACE MATTHEWS;TY JACKSON;;;;BRAIN HOUSE;;;;ISIS WEBSTER, Obstetrician;Primary Nurse;Primary [place] Nurse;Nicu Nurse;Neonatologist;Anesthesiologist;Crna;Nurse Practitioner;Midwife;Nursery Nurse;Scrub Tech           Cord pH:  none    Episiotomy:     Laceration(s):       Estimated Blood Loss (ml): 800cc    Labor Events  Method: None      Augmentation: None   Cervical Ripening:       None        Operative Vaginal Delivery - none    Group B Strep:   Lab Results   Component Value Date/Time    GrBStrep, External Negative 2017     Information for the patient's :  Minna Hoffman [[id number]]   No results found for: ABORH, PCTABR, PCTDIG, BILI, ABORHEXT, ABORH    No results found for: APH, APCO2, APO2, AHCO3, ABEC, ABDC, O2ST, EPHV, PCO2V, PO2V, HCO3V, EBEV, EBDV, SITE, RSCOM

## 2017-09-19 NOTE — ANESTHESIA PROCEDURE NOTES
Spinal Block    Start time: 9/19/2017 10:20 AM  End time: 9/19/2017 10:28 AM  Performed by: Mariza Lopez by: Brando To     Pre-procedure:   Indications: at surgeon's request, post-op pain management and primary anesthetic  Preanesthetic Checklist: patient identified, risks and benefits discussed, anesthesia consent, site marked, patient being monitored and timeout performed    Timeout Time: 10:19          Spinal Block:   Patient Position:  Seated  Prep Region:  Lumbar  Prep: DuraPrep      Location:  L2-3  Technique:  Single shot  Local:  Lidocaine 1%  Local Dose (mL):  3    Needle:   Needle Type:  Pencan  Needle Gauge:  25 G  Attempts:  1      Events: CSF confirmed, no blood with aspiration and no paresthesia        Assessment:  Insertion:  Uncomplicated  Patient tolerance:  Patient tolerated the procedure well with no immediate complications  3.8ZV 6.04% bupivacaine

## 2017-09-20 LAB
BASOPHILS # BLD: 0 K/UL
BASOPHILS NFR BLD: 0 %
BLASTS NFR BLD MANUAL: 0 %
DIFFERENTIAL METHOD BLD: ABNORMAL
EOSINOPHIL # BLD: 0.1 K/UL
EOSINOPHIL NFR BLD: 1 %
ERYTHROCYTE [DISTWIDTH] IN BLOOD BY AUTOMATED COUNT: 13 % (ref 11.5–14.5)
HCT VFR BLD AUTO: 28.4 % (ref 35–47)
HGB BLD-MCNC: 9.7 G/DL (ref 11.5–16)
LYMPHOCYTES # BLD: 1.9 K/UL
LYMPHOCYTES NFR BLD: 23 %
MANUAL DIFFERENTIAL PERFORMED BLD QL: ABNORMAL
MCH RBC QN AUTO: 29.2 PG (ref 26–34)
MCHC RBC AUTO-ENTMCNC: 34.2 G/DL (ref 30–36.5)
MCV RBC AUTO: 85.5 FL (ref 80–99)
METAMYELOCYTES NFR BLD MANUAL: 0 %
MONOCYTES # BLD: 0.7 K/UL
MONOCYTES NFR BLD: 8 %
MYELOCYTES NFR BLD MANUAL: 0 %
NEUTS BAND NFR BLD MANUAL: 1 %
NEUTS SEG # BLD: 5.5 K/UL
NEUTS SEG NFR BLD: 67 %
NRBC # BLD: 0 K/UL (ref 0–0.01)
NRBC BLD-RTO: 0 PER 100 WBC
OTHER CELLS NFR BLD MANUAL: 0 %
PLATELET # BLD AUTO: 113 K/UL (ref 150–400)
PROMYELOCYTES NFR BLD MANUAL: 0 %
RBC # BLD AUTO: 3.32 M/UL (ref 3.8–5.2)
RBC MORPH BLD: ABNORMAL
WBC # BLD AUTO: 8.2 K/UL (ref 3.6–11)
WBC MORPH BLD: ABNORMAL

## 2017-09-20 PROCEDURE — 74011250636 HC RX REV CODE- 250/636: Performed by: OBSTETRICS & GYNECOLOGY

## 2017-09-20 PROCEDURE — 74011250637 HC RX REV CODE- 250/637: Performed by: OBSTETRICS & GYNECOLOGY

## 2017-09-20 PROCEDURE — 85027 COMPLETE CBC AUTOMATED: CPT | Performed by: OBSTETRICS & GYNECOLOGY

## 2017-09-20 PROCEDURE — 36415 COLL VENOUS BLD VENIPUNCTURE: CPT | Performed by: OBSTETRICS & GYNECOLOGY

## 2017-09-20 PROCEDURE — 65410000002 HC RM PRIVATE OB

## 2017-09-20 RX ADMIN — SODIUM CHLORIDE, SODIUM LACTATE, POTASSIUM CHLORIDE, AND CALCIUM CHLORIDE 125 ML/HR: 600; 310; 30; 20 INJECTION, SOLUTION INTRAVENOUS at 06:24

## 2017-09-20 RX ADMIN — DOCUSATE SODIUM 100 MG: 100 CAPSULE, LIQUID FILLED ORAL at 18:24

## 2017-09-20 RX ADMIN — OXYCODONE HYDROCHLORIDE AND ACETAMINOPHEN 1 TABLET: 5; 325 TABLET ORAL at 12:28

## 2017-09-20 RX ADMIN — IBUPROFEN 800 MG: 400 TABLET, FILM COATED ORAL at 08:14

## 2017-09-20 RX ADMIN — OXYCODONE HYDROCHLORIDE AND ACETAMINOPHEN 2 TABLET: 5; 325 TABLET ORAL at 16:35

## 2017-09-20 RX ADMIN — DOCUSATE SODIUM 100 MG: 100 CAPSULE, LIQUID FILLED ORAL at 09:00

## 2017-09-20 RX ADMIN — IBUPROFEN 800 MG: 400 TABLET, FILM COATED ORAL at 00:35

## 2017-09-20 RX ADMIN — IBUPROFEN 800 MG: 400 TABLET, FILM COATED ORAL at 16:35

## 2017-09-20 NOTE — ROUTINE PROCESS
Bedside shift change report given to SIVA Atkins (oncoming nurse) by Kayla Veloz (offgoing nurse). Report included the following information SBAR.

## 2017-09-20 NOTE — LACTATION NOTE
This note was copied from a baby's chart. Infant has -4.8% weight loss. Infant has  11 times and formula fed per mother's choice 4 times in past 24 hours. LATCH scores are 9 and 9. Infant has voided once and stooled 4 times in 24 hours. Encourage frequent breastfeeding for lactogenesis. Mom has lactation resource number for [facility] for discharge needs as well as information on support groups. Mom started pumping at her request.  Mom got drops and plans to continue with some pumping for lactogenesis.

## 2017-09-20 NOTE — ROUTINE PROCESS
Bedside shift change report given to LETICIA Malloy RN (oncoming nurse) by SIVA Garcia RN (offgoing nurse). Report included the following information SBAR, Procedure Summary, Intake/Output, MAR and Recent Results.

## 2017-09-20 NOTE — PROGRESS NOTES
Post-Operative  Day 1    Jaziel Pablo     Assessment: Post-Op day 1, stable    Plan:   1. Routine post-operative care   2. Thrombocytopenia- platelets=113 (956)- recheck mandy am   3. Post-op anemia- asymptomatic, no evidence of active bleeding- home on iron    Information for the patient's :  Emilia Quintero [[id number]]   , Low Transverse   Patient doing well without significant complaint. Nausea and vomiting resolved, tolerating liquids, no flatus, dumont in place. Vitals:  Visit Vitals    /60 (BP 1 Location: Left arm, BP Patient Position: Sitting)    Pulse (!) 55    Temp 97.7 °F (36.5 °C)    Resp 16    Ht 5' 5.5\" (1.664 m)    Wt 86.6 kg (191 lb)    SpO2 100%    Breastfeeding Yes    BMI 31.3 kg/m2     Temp (24hrs), Av.6 °F (36.4 °C), Min:97.4 °F (36.3 °C), Max:98.1 °F (36.7 °C)      Last 24hr Input/Output:    Intake/Output Summary (Last 24 hours) at 17 0841  Last data filed at 17 0815   Gross per 24 hour   Intake          4687.25 ml   Output             1850 ml   Net          2837.25 ml          Exam:        Patient without distress. Lungs clear. Abdomen, bowel sounds present, soft, expected tenderness, fundus firm Wound dressing intact     Perineum normal lochia noted               Lower extremities are negative for swelling, cords or tenderness.     Labs:   Lab Results   Component Value Date/Time    WBC 8.2 2017 05:16 AM    WBC 8.4 2017 09:28 AM    WBC 11.4 2014 04:35 AM    WBC 15.7 2014 08:25 PM    HGB 9.7 2017 05:16 AM    HGB 11.6 2017 09:28 AM    HGB 10.3 2014 04:35 AM    HGB 12.1 2014 08:25 PM    HCT 28.4 2017 05:16 AM    HCT 34.7 2017 09:28 AM    HCT 29.8 2014 04:35 AM    HCT 36.1 2014 08:25 PM    PLATELET 333  05:16 AM    PLATELET 230  09:28 AM    PLATELET 371  04:35 AM    PLATELET 513  08:25 PM       Recent Results (from the past 24 hour(s))   CBC W/O DIFF    Collection Time: 09/19/17  9:28 AM   Result Value Ref Range    WBC 8.4 3.6 - 11.0 K/uL    RBC 4.06 3.80 - 5.20 M/uL    HGB 11.6 11.5 - 16.0 g/dL    HCT 34.7 (L) 35.0 - 47.0 %    MCV 85.5 80.0 - 99.0 FL    MCH 28.6 26.0 - 34.0 PG    MCHC 33.4 30.0 - 36.5 g/dL    RDW 13.2 11.5 - 14.5 %    PLATELET 816 (L) 063 - 400 K/uL   TYPE & SCREEN    Collection Time: 09/19/17  9:28 AM   Result Value Ref Range    Crossmatch Expiration 09/22/2017     ABO/Rh(D) AB POSITIVE     Antibody screen NEG    CBC WITH MANUAL DIFF    Collection Time: 09/20/17  5:16 AM   Result Value Ref Range    WBC 8.2 3.6 - 11.0 K/uL    RBC 3.32 (L) 3.80 - 5.20 M/uL    HGB 9.7 (L) 11.5 - 16.0 g/dL    HCT 28.4 (L) 35.0 - 47.0 %    MCV 85.5 80.0 - 99.0 FL    MCH 29.2 26.0 - 34.0 PG    MCHC 34.2 30.0 - 36.5 g/dL    RDW 13.0 11.5 - 14.5 %    PLATELET 610 (L) 942 - 400 K/uL    NEUTROPHILS 67 %    BAND NEUTROPHILS 1 %    LYMPHOCYTES 23 %    MONOCYTES 8 %    EOSINOPHILS 1 %    BASOPHILS 0 %    METAMYELOCYTES 0 %    MYELOCYTES 0 %    PROMYELOCYTES 0 %    BLASTS 0 %    OTHER CELL 0      ABS. NEUTROPHILS 5.5 K/UL    ABS. LYMPHOCYTES 1.9 K/UL    ABS. MONOCYTES 0.7 K/UL    ABS. EOSINOPHILS 0.1 K/UL    ABS.  BASOPHILS 0.0 K/UL    RBC COMMENTS NORMOCYTIC, NORMOCHROMIC      WBC COMMENTS TOXIC GRANULATION      DF MANUAL      NRBC 0.0 0  WBC    ABSOLUTE NRBC 0.00 0.00 - 0.01 K/uL    DIFFERENTIAL MANUAL DIFFERENTIAL ORDERED

## 2017-09-21 LAB
ERYTHROCYTE [DISTWIDTH] IN BLOOD BY AUTOMATED COUNT: 13.3 % (ref 11.5–14.5)
HCT VFR BLD AUTO: 28.9 % (ref 35–47)
HGB BLD-MCNC: 9.6 G/DL (ref 11.5–16)
MCH RBC QN AUTO: 28.7 PG (ref 26–34)
MCHC RBC AUTO-ENTMCNC: 33.2 G/DL (ref 30–36.5)
MCV RBC AUTO: 86.5 FL (ref 80–99)
PLATELET # BLD AUTO: 119 K/UL (ref 150–400)
RBC # BLD AUTO: 3.34 M/UL (ref 3.8–5.2)
WBC # BLD AUTO: 8.5 K/UL (ref 3.6–11)

## 2017-09-21 PROCEDURE — 85027 COMPLETE CBC AUTOMATED: CPT | Performed by: OBSTETRICS & GYNECOLOGY

## 2017-09-21 PROCEDURE — 77030032490 HC SLV COMPR SCD KNE COVD -B

## 2017-09-21 PROCEDURE — 74011250637 HC RX REV CODE- 250/637: Performed by: OBSTETRICS & GYNECOLOGY

## 2017-09-21 PROCEDURE — 65410000002 HC RM PRIVATE OB

## 2017-09-21 PROCEDURE — 36415 COLL VENOUS BLD VENIPUNCTURE: CPT | Performed by: OBSTETRICS & GYNECOLOGY

## 2017-09-21 RX ORDER — ACETAMINOPHEN 325 MG/1
650 TABLET ORAL
Status: DISCONTINUED | OUTPATIENT
Start: 2017-09-21 | End: 2017-09-22 | Stop reason: HOSPADM

## 2017-09-21 RX ORDER — ACETAMINOPHEN 325 MG/1
TABLET ORAL
Status: COMPLETED
Start: 2017-09-21 | End: 2017-09-22

## 2017-09-21 RX ADMIN — ACETAMINOPHEN 650 MG: 325 TABLET ORAL at 00:39

## 2017-09-21 RX ADMIN — IBUPROFEN 800 MG: 400 TABLET, FILM COATED ORAL at 18:34

## 2017-09-21 RX ADMIN — ACETAMINOPHEN 650 MG: 325 TABLET ORAL at 13:21

## 2017-09-21 RX ADMIN — ACETAMINOPHEN 650 MG: 325 TABLET ORAL at 18:33

## 2017-09-21 RX ADMIN — IBUPROFEN 800 MG: 400 TABLET, FILM COATED ORAL at 07:45

## 2017-09-21 RX ADMIN — ACETAMINOPHEN 650 MG: 325 TABLET ORAL at 07:40

## 2017-09-21 RX ADMIN — IBUPROFEN 800 MG: 400 TABLET, FILM COATED ORAL at 00:20

## 2017-09-21 RX ADMIN — DOCUSATE SODIUM 100 MG: 100 CAPSULE, LIQUID FILLED ORAL at 18:33

## 2017-09-21 RX ADMIN — DOCUSATE SODIUM 100 MG: 100 CAPSULE, LIQUID FILLED ORAL at 08:22

## 2017-09-21 NOTE — LACTATION NOTE
This note was copied from a baby's chart. Exclusively formula fed after 1800 feeding last night. Has pumping options available  No questions or concerns from mother today. Call prn.

## 2017-09-21 NOTE — ROUTINE PROCESS
Bedside and Verbal shift change report given to JARRET Arce (oncoming nurse) by Lamine Sharma RN (offgoing nurse). Report included the following information SBAR.

## 2017-09-21 NOTE — PROGRESS NOTES
Duramorph Follow-Up Note    2 Days Post-Op sp Procedure(s):   SECTION. /82  Pulse 63  Temp 37.1 °C (98.7 °F)  Resp 16  Ht 5' 5.5\" (1.664 m)  Wt 86.6 kg (191 lb)  SpO2 100%  Breastfeeding? Yes  BMI 31.3 kg/m2. Patient is POD-1 S/P epidural duramorph. Pain is well controlled  Patient reports no headache, fever, weakness or numbness. Epidural/spinal tap site is clean, dry and intact. No obvious Anesthesia complications noted. Plan:    Pain management as per primary service.

## 2017-09-21 NOTE — PROGRESS NOTES
Post-Operative  Day 2    Jayde Franco     Assessment: Post-Op day 2, doing well  Thrombocytopenia, resolving   Plts 119 (up from 113)   Acute blood loss anemia, asymptomatic  Iron on d/c home     Plan:   1. Routine post-operative care    Information for the patient's :  Suman Calderón [[id number]]   , Low Transverse   Patient doing well without significant complaint. Nausea and vomiting resolved, tolerating diet, passing flatus, voiding and ambulating without difficulty. Vitals:  Visit Vitals    BP (!) 149/93    Pulse (!) 53    Temp 97.9 °F (36.6 °C)    Resp 16    Ht 5' 5.5\" (1.664 m)    Wt 86.6 kg (191 lb)    SpO2 100%    Breastfeeding Yes    BMI 31.3 kg/m2     Temp (24hrs), Av.2 °F (36.8 °C), Min:97.9 °F (36.6 °C), Max:98.3 °F (36.8 °C)        Exam:        Patient without distress. Abdomen, bowel sounds present, soft, expected tenderness, fundus firm                Wound incision clean, dry and intact               Lower extremities are negative for swelling, cords or tenderness.     Labs:   Lab Results   Component Value Date/Time    WBC 8.5 2017 04:55 AM    WBC 8.2 2017 05:16 AM    WBC 8.4 2017 09:28 AM    WBC 11.4 2014 04:35 AM    WBC 15.7 2014 08:25 PM    HGB 9.6 2017 04:55 AM    HGB 9.7 2017 05:16 AM    HGB 11.6 2017 09:28 AM    HGB 10.3 2014 04:35 AM    HGB 12.1 2014 08:25 PM    HCT 28.9 2017 04:55 AM    HCT 28.4 2017 05:16 AM    HCT 34.7 2017 09:28 AM    HCT 29.8 2014 04:35 AM    HCT 36.1 2014 08:25 PM    PLATELET 273  04:55 AM    PLATELET 858  05:16 AM    PLATELET 430  09:28 AM    PLATELET 737  04:35 AM    PLATELET 846  08:25 PM       Recent Results (from the past 24 hour(s))   CBC W/O DIFF    Collection Time: 17  4:55 AM   Result Value Ref Range    WBC 8.5 3.6 - 11.0 K/uL    RBC 3.34 (L) 3.80 - 5.20 M/uL HGB 9.6 (L) 11.5 - 16.0 g/dL    HCT 28.9 (L) 35.0 - 47.0 %    MCV 86.5 80.0 - 99.0 FL    MCH 28.7 26.0 - 34.0 PG    MCHC 33.2 30.0 - 36.5 g/dL    RDW 13.3 11.5 - 14.5 %    PLATELET 941 (L) 547 - 400 K/uL

## 2017-09-22 VITALS
TEMPERATURE: 97.8 F | SYSTOLIC BLOOD PRESSURE: 153 MMHG | HEIGHT: 66 IN | OXYGEN SATURATION: 100 % | HEART RATE: 50 BPM | WEIGHT: 191 LBS | RESPIRATION RATE: 16 BRPM | DIASTOLIC BLOOD PRESSURE: 89 MMHG | BODY MASS INDEX: 30.7 KG/M2

## 2017-09-22 PROCEDURE — 74011250637 HC RX REV CODE- 250/637

## 2017-09-22 PROCEDURE — 74011250637 HC RX REV CODE- 250/637: Performed by: OBSTETRICS & GYNECOLOGY

## 2017-09-22 RX ORDER — HYDROCODONE BITARTRATE AND ACETAMINOPHEN 5; 325 MG/1; MG/1
1 TABLET ORAL
Qty: 30 TAB | Refills: 0 | Status: SHIPPED | OUTPATIENT
Start: 2017-09-22

## 2017-09-22 RX ORDER — DOCUSATE SODIUM 100 MG/1
100 CAPSULE, LIQUID FILLED ORAL 2 TIMES DAILY
Qty: 30 CAP | Refills: 0 | Status: SHIPPED | OUTPATIENT
Start: 2017-09-22 | End: 2017-10-07

## 2017-09-22 RX ORDER — LANOLIN ALCOHOL/MO/W.PET/CERES
325 CREAM (GRAM) TOPICAL 2 TIMES DAILY WITH MEALS
Qty: 60 TAB | Refills: 3 | Status: SHIPPED | OUTPATIENT
Start: 2017-09-22

## 2017-09-22 RX ORDER — IBUPROFEN 600 MG/1
600 TABLET ORAL
Qty: 30 TAB | Refills: 1 | Status: SHIPPED | OUTPATIENT
Start: 2017-09-22

## 2017-09-22 RX ADMIN — ACETAMINOPHEN 650 MG: 325 TABLET ORAL at 06:13

## 2017-09-22 RX ADMIN — ACETAMINOPHEN 650 MG: 325 TABLET ORAL at 00:37

## 2017-09-22 RX ADMIN — ACETAMINOPHEN 650 MG: 325 TABLET ORAL at 12:11

## 2017-09-22 RX ADMIN — DOCUSATE SODIUM 100 MG: 100 CAPSULE, LIQUID FILLED ORAL at 09:43

## 2017-09-22 RX ADMIN — IBUPROFEN 800 MG: 400 TABLET, FILM COATED ORAL at 09:43

## 2017-09-22 RX ADMIN — IBUPROFEN 800 MG: 400 TABLET, FILM COATED ORAL at 01:30

## 2017-09-22 NOTE — DISCHARGE INSTRUCTIONS
Delivery (Postpartum Care): After Your Visit    Your Care Instructions    Your baby was delivered through a cut, called an incision, in your belly. This surgery is called a  delivery, or a . After childbirth (postpartum period), your body goes through many changes. In the next few weeks, your body will slowly heal. It can take 4 weeks or more for the incision from your surgery to heal. It is easy to get too tired and overwhelmed during the first weeks after your baby is born. You may feel emotional during this time. Changes in your hormones can shift your mood without warning. It is easy to get too tired and overwhelmed during the first weeks after childbirth. Take it easy on yourself. You may find it hard to meet the extra demands on your energy and time. Get rest whenever you can, accept help from others, and eat well and drink plenty of fluids. After a , you may have gas or need to burp a lot. You may have some light vaginal bleeding or spotting for up to 6 weeks after surgery. It is normal to have pain in the incision area off and on during the next 6 months. No driving for 1 week after delivery. No heavy lifting. No more than 8 lbs or the size of baby for 2-3 weeks. Limit use of stairs. 1-2 times per day for the first week after delivery. Follow-up care is a key part of your treatment and safety. Be sure to make and go to all appointments, and call your doctor if you are having problems. Its also a good idea to know your test results and keep a list of the medicines you take. Around 4 to 6 weeks after your baby's birth, you will have a follow-up visit with your doctor. This visit is your time to talk to your doctor about anything you are concerned or curious about. Keep a list of questions to bring to your postpartum visit. Your questions might be about:  Changes in your breasts, such as lumps or soreness.   When to expect your menstrual period to start again.  What form of birth control is best for you. Weight you have put on during the pregnancy. Exercise options. What foods and drinks are best for you, especially if you are breast-feeding. Problems you might be having with breast-feeding. When you can have sex. Some women may want to talk about lubricants for the vagina. Any feelings of sadness or restlessness that you are having. How can you care for yourself at home? Be sure that you understand any instructions your doctor has given you after surgery. This will guide your activities and tell you what to watch for in the next few weeks. Vaginal bleeding and cramps  After delivery, you will have a bloody discharge from the vagina. This will turn pink within a week and then white or yellow after about 10 days. It may last for 2 to 4 weeks or longer, until the uterus has healed. You may have cramps for the first few days after childbirth. These are normal and occur as the uterus shrinks to normal size. Take an over-the-counter pain medicine, such as acetaminophen (Tylenol), ibuprofen (Advil, Motrin), or naproxen (Aleve), for cramps. Read and follow all instructions on the label. Do not take aspirin, because it can cause more bleeding. Take other medicines exactly as prescribed. Call your doctor if you have any problems with your medicine. Incision  You may have had staples removed while you were in the hospital. Keep the area clean, and wash it with water and mild soap. If you had stitches, they should dissolve on their own and should not need to be removed. Follow your doctor's instructions for cleaning the stitched area. If you have steri-strips (tape) the will come off on their own in 7-10 days. Breast-feeding and breast fullness  Breast-feed your baby in positions that do not put pressure on your incision, such as side-lying or football-hold positions.  Ask your nurse, doctor, midwife, or lactation specialist to show you these positions if you do not know what they are. Your breasts may overfill (engorge) in the first few days after delivery. To help milk flow and to relieve pain, warm your breasts in the shower or by using warm, moist towels before nursing. Express a small amount of milk to soften your breast near the nipple and then feed your baby. Put an ice or cold pack on your breast for a few minutes after nursing to reduce swelling and pain. Put a thin cloth between the ice pack and your skin. If you are not nursing, do not put warmth on your breasts or touch your breasts. Wear a tight bra or sports bra, and use an ice or cold pack on your breasts to reduce swelling and pain. Breast fullness usually lasts 3 to 4 days. Activity  Eat a balanced diet. Do not try to lose weight by cutting calories. Keep taking your prenatal vitamins, or take a multivitamin. Get as much rest as you can. Try to take naps when your baby sleeps during the day. Get help with household chores from family or friends, if you can. Do not try to do it all yourself. Get some gentle exercise, such as walking, every day. Do not lift more than 10 pounds for the next 4 to 6 weeks. Do not have sex or use tampons until you have stopped bleeding and at least 4 to 6 weeks have passed since you gave birth. Talk to your doctor about birth control. You can get pregnant even before your period returns. Also, you can get pregnant while you are breast-feeding. Mental health  It is normal to have some sadness, anxiety, sleeplessness, and mood swings after you go home. If you feel upset or hopeless for more than a few days, talk to your doctor. If you have any thoughts of hurting yourself or anyone else--including your baby--call 911 or go to the emergency room. Many women get the \"baby blues\" during the first few days after childbirth. The \"baby blues\" usually peak around the fourth day and then ease up in less than 2 weeks.  If you have the \"baby blues\" for more than a few days, or if you have thoughts of hurting yourself or your baby, call your doctor right away. Constipation and hemorrhoids  Drink plenty of fluids (8 to 10 glasses a day), especially water. · Eat plenty of fiber each day to avoid constipation. Include foods such as whole-grain breads and cereals, raw vegetables, raw and dried fruits, and beans. · If you have hemorrhoids or swelling or pain around the opening of your vagina, try using cold and heat. You can put ice or a cold pack on the area for 10 to 20 minutes at a time. Put a thin cloth between the ice and your skin. Also try sitting in a few inches of warm water (sitz bath) 3 times a day and after bowel movements. Drink plenty of fluids, enough so that your urine is light yellow or clear like water. If you have kidney, heart, or liver disease and have to limit fluids, talk with your doctor before you increase the amount of fluids you drink. When should you call for help? Call 911 anytime you think you may need emergency care. For example, call if:  You are thinking of hurting yourself, your baby, or anyone else. You have sudden, severe pain in your belly. You pass out (lose consciousness). Call your doctor now or seek immediate medical care if:  You have severe vaginal bleeding. You are passing blood clots and soaking through a pad each hour for 2 or more hours. Your vaginal bleeding seems to be getting heavier or is still bright red 4 days after delivery, or you pass blood clots larger than the size of a golf ball. You have increased redness, heat, or drainage in the incision area. You are dizzy or lightheaded, or you feel like you may faint. You are vomiting or cannot keep fluids down. You have a fever. You have new or more belly pain. You pass tissue (not just blood). The incision seems to be pulling open or starts bleeding. Your vaginal discharge smells bad. Your belly feels more tender or full and hard.   You have pain or redness in one or both breasts. You feel sad, anxious, or hopeless for more than a few days. Where can you learn more? Go to [email or web address]    Enter E692 in the search box to learn more about \" Delivery: After Your Visit\". or     Go to [email or web address]    Enter E491  in the search box to learn more about \"Postpartum Care: After Your Visit\". This care instruction is for use with your licensed healthcare professional. If you have questions about a medical condition or this instruction, always ask your healthcare professional. Care instructions adapted by [employer] (which disclaims liability or warranty for this information) from Cedric Faria, [address] [place] [address] . [facility] disclaims any warranty or liability for your use of this information. [employer] Activation    Thank you for requesting access to [employer]. Please follow the instructions below to securely access and download your online medical record. [employer] allows you to send messages to your doctor, view your test results, renew your prescriptions, schedule appointments, and more. How Do I Sign Up? 1. In your internet browser, go to [email or web address]. 2. Click on the First Time User? Click Here link in the Sign In box. You will see the New Member Sign Up page. 3. Enter your [employer] Access Code exactly as it appears below. You will not need to use this code after youve completed the sign-up process. If you do not sign up before the expiration date, you must request a new code. [employer] Access Code: Activation code not generated  Current [employer] Status: Active (This is the date your [employer] access code will )    4. Enter the last four digits of your Social Security Number (xxxx) and Date of Birth (mm/dd/yyyy) as indicated and click Submit. You will be taken to the next sign-up page. 5. Create a [employer] ID.  This will be your [employer] login ID and cannot be changed, so think of one that is secure and easy to remember. 6. Create a [employer] password. You can change your password at any time. 7. Enter your Password Reset Question and Answer. This can be used at a later time if you forget your password. 8. Enter your e-mail address. You will receive e-mail notification when new information is available in [address] 9. Click Sign Up. You can now view and download portions of your medical record. 10. Click the Download Summary menu link to download a portable copy of your medical information. Additional Information    If you have questions, please visit the Frequently Asked Questions section of the [employer] website at [email or web address] Remember, [employer] is NOT to be used for urgent needs. For medical emergencies, dial 911.

## 2017-09-22 NOTE — DISCHARGE SUMMARY
Obstetrical Discharge Summary     Name: Precious Mraks MRN: [id number]  SSN: [id number]    YOB: 1985  Age: 27 y.o. Sex: female      Admit Date: 2017    Discharge Date: 2017     Admitting Physician: Pricilla Ramey MD     Attending Physician:  Pricilla Ramey MD     Admission Diagnoses: Repeat C/Section  History of  delivery    Procedure Performed:  Procedure(s):   SECTION  Surgical      Discharge Diagnoses:   Information for the patient's :  Erven Primrose [[id number]]   Delivery of a 3.375 kg female infant via , Low Transverse on 2017 at 10:43 AM  by . Apgars were 8 and 9. Additional Diagnoses:   Hospital Problems  Date Reviewed: 2017          Codes Class Noted POA    History of  delivery ICD-10-CM: Z98.891  ICD-9-CM: V45.89  2017 Unknown             Lab Results   Component Value Date/Time    Rubella, External Immune 2.30 2017    GrBStrep, External Negative 2017       Hospital Course: Normal hospital course following the scheduled repeat c/s. Mild range BPs on POD#3 without HA, vision changes, RUQ pain. Patient desires discharge today, with mother who is a nurse, checking BPs. Reviewed warning signs for postpartum preeclampsia, and return in one week for clinic BP check. Patient Disposition: Home      Followup Care:  Discharge Condition: good  Activity as tolerated, No sex for 6 weeks, No driving while on analgesics and no lifting more than 10 pounds  Regular Diet  Keep wound clean and dry    Patient Instructions:   Current Discharge Medication List      START taking these medications    Details   ibuprofen (MOTRIN) 600 mg tablet Take 1 Tab by mouth every six (6) hours as needed for Pain. Qty: 30 Tab, Refills: 1      docusate sodium (COLACE) 100 mg capsule Take 1 Cap by mouth two (2) times a day for 30 doses.   Qty: 30 Cap, Refills: 0      HYDROcodone-acetaminophen (NORCO) 5-325 mg per tablet Take 1 Tab by mouth every four (4) hours as needed for Pain. Max Daily Amount: 6 Tabs. Qty: 30 Tab, Refills: 0      ferrous sulfate 325 mg (65 mg iron) tablet Take 1 Tab by mouth two (2) times daily (with meals). Qty: 60 Tab, Refills: 3         CONTINUE these medications which have NOT CHANGED    Details   PNV no.24-iron-folic acid-dha (PRENATAL DHA+COMPLETE PRENATAL) -300 mg-mcg-mg cmpk Take  by mouth. Reference my discharge instructions. Follow-up Appointments   Procedures    FOLLOW UP VISIT Appointment in: One Week     Standing Status:   Standing     Number of Occurrences:   1     Order Specific Question:   Appointment in     Answer:    One Week        Signed By:  Ana Ward MD     September 22, 2017

## 2017-09-22 NOTE — ROUTINE PROCESS
Bedside shift change report given to SIVA Atkins (oncoming nurse) by Juma Veloz (offgoing nurse). Report included the following information SBAR.

## 2017-09-22 NOTE — PROGRESS NOTES
[facility]  Day 3    [facility]       Assessment: Post-Op day 3, doing well    Plan:   1. Discharge home today  2. Follow up in office in 1 week for BP/wound check with Natalia Soto MD.  No HTN pre-preg, in pregnancy, or during delivery. Reviewed warning signs for post-partum pre-eclampsia. 3. Post partum activity/wound care advised, diet as tolerated  4. Discharge Medications: ibuprofen, hydrocodone, iron and medications prior to admission      Information for the patient's :  Anni Dockery [[id number]]   , Low Transverse   Patient doing well without significant complaint. Tolerating diet, passing flatus, voiding and ambulating without difficulty. Normal lochia. Breast and bottle feeding. No HA, vision changes, RUQ pain. 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 Vitals:  Visit Vitals    /89 (BP 1 Location: Left arm, BP Patient Position: Sitting)    Pulse (!) 50    Temp 97.8 °F (36.6 °C)    Resp 16    Ht 5' 5.5\" (1.664 m)    Wt 86.6 kg (191 lb)    SpO2 100%    Breastfeeding Yes    BMI 31.3 kg/m2     Temp (24hrs), Av.2 °F (36.8 °C), Min:97.8 °F (36.6 °C), Max:98.7 °F (37.1 °C)        Exam:        Patient without distress. Abdomen, bowel sounds present, soft, expected tenderness, fundus firm      Wound incision clean, dry and intact                 Lower extremities are negative for swelling, cords or tenderness.     Labs:   Lab Results   Component Value Date/Time    WBC 8.5 2017 04:55 AM    WBC 8.2 2017 05:16 AM    WBC 8.4 2017 09:28 AM    WBC 11.4 2014 04:35 AM    WBC 15.7 2014 08:25 PM    HGB 9.6 2017 04:55 AM    HGB 9.7 2017 05:16 AM    HGB 11.6 2017 09:28 AM    HGB 10.3 2014 04:35 AM    HGB 12.1 2014 08:25 PM    HCT 28.9 2017 04:55 AM    HCT 28.4 2017 05:16 AM    HCT 34.7 2017 09:28 AM    HCT 29.8 2014 04:35 AM    HCT 36.1 04/09/2014 08:25 PM    PLATELET 513 01/08/9975 04:55 AM    PLATELET 746 50/31/0706 05:16 AM    PLATELET 871 63/38/4089 09:28 AM    PLATELET 392 83/99/6316 04:35 AM    PLATELET 783 53/09/3274 08:25 PM       No results found for this or any previous visit (from the past 24 hour(s)).

## 2022-03-19 PROBLEM — Z98.891 HISTORY OF CESAREAN DELIVERY: Status: ACTIVE | Noted: 2017-09-19

## 2023-07-06 LAB
ABO, EXTERNAL RESULT: NORMAL
C. TRACHOMATIS, EXTERNAL RESULT: NEGATIVE
HEP B, EXTERNAL RESULT: NEGATIVE
HEPATITIS C ANTIBODY, EXTERNAL RESULT: NON REACTIVE
HIV, EXTERNAL RESULT: NON REACTIVE
N. GONORRHOEAE, EXTERNAL RESULT: NEGATIVE
RUBELLA TITER, EXTERNAL RESULT: NORMAL
T. PALLIDUM (SYPHILIS) ANTIBODY, EXTERNAL RESULT: NON REACTIVE

## 2024-01-13 LAB — GBS, EXTERNAL RESULT: NEGATIVE

## 2024-01-31 ENCOUNTER — ANESTHESIA (OUTPATIENT)
Dept: LABOR AND DELIVERY | Facility: HOSPITAL | Age: 39
End: 2024-01-31
Payer: COMMERCIAL

## 2024-01-31 ENCOUNTER — ANESTHESIA EVENT (OUTPATIENT)
Dept: LABOR AND DELIVERY | Facility: HOSPITAL | Age: 39
End: 2024-01-31
Payer: COMMERCIAL

## 2024-01-31 ENCOUNTER — HOSPITAL ENCOUNTER (INPATIENT)
Facility: HOSPITAL | Age: 39
LOS: 2 days | Discharge: HOME | End: 2024-02-02
Attending: OBSTETRICS & GYNECOLOGY | Admitting: OBSTETRICS & GYNECOLOGY
Payer: COMMERCIAL

## 2024-01-31 DIAGNOSIS — G89.18 POST-OP PAIN: Primary | ICD-10-CM

## 2024-01-31 PROBLEM — Z33.1 PREGNANCY AS INCIDENTAL FINDING: Status: ACTIVE | Noted: 2024-01-31

## 2024-01-31 LAB
ABO + RH BLD: NORMAL
ALBUMIN SERPL-MCNC: 3 G/DL (ref 3.5–5)
ALBUMIN/GLOB SERPL: 0.8 (ref 1.1–2.2)
ALP SERPL-CCNC: 85 U/L (ref 45–117)
ALT SERPL-CCNC: 20 U/L (ref 12–78)
AMPHET UR QL SCN: NEGATIVE
ANION GAP SERPL CALC-SCNC: 4 MMOL/L (ref 5–15)
AST SERPL-CCNC: 17 U/L (ref 15–37)
BARBITURATES UR QL SCN: NEGATIVE
BENZODIAZ UR QL: NEGATIVE
BILIRUB SERPL-MCNC: 0.6 MG/DL (ref 0.2–1)
BLOOD GROUP ANTIBODIES SERPL: NORMAL
BUN SERPL-MCNC: 9 MG/DL (ref 6–20)
BUN/CREAT SERPL: 14 (ref 12–20)
CALCIUM SERPL-MCNC: 10.3 MG/DL (ref 8.5–10.1)
CANNABINOIDS UR QL SCN: NEGATIVE
CHLORIDE SERPL-SCNC: 109 MMOL/L (ref 97–108)
CO2 SERPL-SCNC: 25 MMOL/L (ref 21–32)
COCAINE UR QL SCN: NEGATIVE
CREAT SERPL-MCNC: 0.65 MG/DL (ref 0.55–1.02)
ERYTHROCYTE [DISTWIDTH] IN BLOOD BY AUTOMATED COUNT: 13.3 % (ref 11.5–14.5)
GLOBULIN SER CALC-MCNC: 4 G/DL (ref 2–4)
GLUCOSE SERPL-MCNC: 83 MG/DL (ref 65–100)
HCT VFR BLD AUTO: 34.7 % (ref 35–47)
HGB BLD-MCNC: 11.6 G/DL (ref 11.5–16)
Lab: NORMAL
MCH RBC QN AUTO: 29.4 PG (ref 26–34)
MCHC RBC AUTO-ENTMCNC: 33.4 G/DL (ref 30–36.5)
MCV RBC AUTO: 88.1 FL (ref 80–99)
METHADONE UR QL: NEGATIVE
NRBC # BLD: 0 K/UL (ref 0–0.01)
NRBC BLD-RTO: 0 PER 100 WBC
OPIATES UR QL: NEGATIVE
PCP UR QL: NEGATIVE
PLATELET # BLD AUTO: 152 K/UL (ref 150–400)
PMV BLD AUTO: 12.2 FL (ref 8.9–12.9)
POTASSIUM SERPL-SCNC: 3.7 MMOL/L (ref 3.5–5.1)
PROT SERPL-MCNC: 7 G/DL (ref 6.4–8.2)
RBC # BLD AUTO: 3.94 M/UL (ref 3.8–5.2)
SODIUM SERPL-SCNC: 138 MMOL/L (ref 136–145)
SPECIMEN EXP DATE BLD: NORMAL
WBC # BLD AUTO: 9.1 K/UL (ref 3.6–11)

## 2024-01-31 PROCEDURE — 6370000000 HC RX 637 (ALT 250 FOR IP): Performed by: OBSTETRICS & GYNECOLOGY

## 2024-01-31 PROCEDURE — 2580000003 HC RX 258: Performed by: ANESTHESIOLOGY

## 2024-01-31 PROCEDURE — 2500000003 HC RX 250 WO HCPCS: Performed by: OBSTETRICS & GYNECOLOGY

## 2024-01-31 PROCEDURE — A4216 STERILE WATER/SALINE, 10 ML: HCPCS | Performed by: OBSTETRICS & GYNECOLOGY

## 2024-01-31 PROCEDURE — 3700000156 HC EPIDURAL ANESTHESIA

## 2024-01-31 PROCEDURE — 6360000002 HC RX W HCPCS: Performed by: OBSTETRICS & GYNECOLOGY

## 2024-01-31 PROCEDURE — 59025 FETAL NON-STRESS TEST: CPT

## 2024-01-31 PROCEDURE — 80053 COMPREHEN METABOLIC PANEL: CPT

## 2024-01-31 PROCEDURE — 85027 COMPLETE CBC AUTOMATED: CPT

## 2024-01-31 PROCEDURE — 36415 COLL VENOUS BLD VENIPUNCTURE: CPT

## 2024-01-31 PROCEDURE — 80307 DRUG TEST PRSMV CHEM ANLYZR: CPT

## 2024-01-31 PROCEDURE — 86850 RBC ANTIBODY SCREEN: CPT

## 2024-01-31 PROCEDURE — 6360000002 HC RX W HCPCS: Performed by: ANESTHESIOLOGY

## 2024-01-31 PROCEDURE — 2500000003 HC RX 250 WO HCPCS: Performed by: ANESTHESIOLOGY

## 2024-01-31 PROCEDURE — 4A1HXCZ MONITORING OF PRODUCTS OF CONCEPTION, CARDIAC RATE, EXTERNAL APPROACH: ICD-10-PCS | Performed by: OBSTETRICS & GYNECOLOGY

## 2024-01-31 PROCEDURE — 1120000000 HC RM PRIVATE OB

## 2024-01-31 PROCEDURE — 7210000100 HC LABOR FEE PER 1 HR

## 2024-01-31 PROCEDURE — 2580000003 HC RX 258: Performed by: OBSTETRICS & GYNECOLOGY

## 2024-01-31 PROCEDURE — 86901 BLOOD TYPING SEROLOGIC RH(D): CPT

## 2024-01-31 PROCEDURE — 6370000000 HC RX 637 (ALT 250 FOR IP): Performed by: ANESTHESIOLOGY

## 2024-01-31 PROCEDURE — 86900 BLOOD TYPING SEROLOGIC ABO: CPT

## 2024-01-31 RX ORDER — LANOLIN/MINERAL OIL
LOTION (ML) TOPICAL
Status: DISCONTINUED | OUTPATIENT
Start: 2024-01-31 | End: 2024-02-02 | Stop reason: HOSPADM

## 2024-01-31 RX ORDER — ASPIRIN 81 MG/1
81 TABLET, CHEWABLE ORAL DAILY
Status: ON HOLD | COMMUNITY
End: 2024-02-02 | Stop reason: HOSPADM

## 2024-01-31 RX ORDER — OXYTOCIN 10 [USP'U]/ML
INJECTION, SOLUTION INTRAMUSCULAR; INTRAVENOUS PRN
Status: DISCONTINUED | OUTPATIENT
Start: 2024-01-31 | End: 2024-01-31 | Stop reason: SDUPTHER

## 2024-01-31 RX ORDER — BUPIVACAINE HYDROCHLORIDE 7.5 MG/ML
INJECTION, SOLUTION INTRASPINAL PRN
Status: DISCONTINUED | OUTPATIENT
Start: 2024-01-31 | End: 2024-01-31 | Stop reason: SDUPTHER

## 2024-01-31 RX ORDER — SODIUM CHLORIDE, SODIUM LACTATE, POTASSIUM CHLORIDE, AND CALCIUM CHLORIDE .6; .31; .03; .02 G/100ML; G/100ML; G/100ML; G/100ML
1000 INJECTION, SOLUTION INTRAVENOUS ONCE
Status: COMPLETED | OUTPATIENT
Start: 2024-01-31 | End: 2024-01-31

## 2024-01-31 RX ORDER — KETOROLAC TROMETHAMINE 30 MG/ML
INJECTION, SOLUTION INTRAMUSCULAR; INTRAVENOUS PRN
Status: DISCONTINUED | OUTPATIENT
Start: 2024-01-31 | End: 2024-01-31 | Stop reason: SDUPTHER

## 2024-01-31 RX ORDER — SODIUM CHLORIDE 0.9 % (FLUSH) 0.9 %
5-40 SYRINGE (ML) INJECTION PRN
Status: DISCONTINUED | OUTPATIENT
Start: 2024-01-31 | End: 2024-02-02 | Stop reason: HOSPADM

## 2024-01-31 RX ORDER — MORPHINE SULFATE 0.5 MG/ML
INJECTION, SOLUTION EPIDURAL; INTRATHECAL; INTRAVENOUS PRN
Status: DISCONTINUED | OUTPATIENT
Start: 2024-01-31 | End: 2024-01-31 | Stop reason: SDUPTHER

## 2024-01-31 RX ORDER — SODIUM CHLORIDE 0.9 % (FLUSH) 0.9 %
5-40 SYRINGE (ML) INJECTION EVERY 12 HOURS SCHEDULED
Status: DISCONTINUED | OUTPATIENT
Start: 2024-01-31 | End: 2024-02-01

## 2024-01-31 RX ORDER — OXYCODONE HYDROCHLORIDE 5 MG/1
5 TABLET ORAL EVERY 4 HOURS PRN
Status: DISCONTINUED | OUTPATIENT
Start: 2024-01-31 | End: 2024-02-02 | Stop reason: HOSPADM

## 2024-01-31 RX ORDER — LIDOCAINE HCL/EPINEPHRINE/PF 2%-1:200K
VIAL (ML) INJECTION PRN
Status: DISCONTINUED | OUTPATIENT
Start: 2024-01-31 | End: 2024-01-31 | Stop reason: SDUPTHER

## 2024-01-31 RX ORDER — SODIUM CHLORIDE, SODIUM LACTATE, POTASSIUM CHLORIDE, CALCIUM CHLORIDE 600; 310; 30; 20 MG/100ML; MG/100ML; MG/100ML; MG/100ML
INJECTION, SOLUTION INTRAVENOUS CONTINUOUS PRN
Status: DISCONTINUED | OUTPATIENT
Start: 2024-01-31 | End: 2024-01-31 | Stop reason: SDUPTHER

## 2024-01-31 RX ORDER — ACETAMINOPHEN 500 MG
1000 TABLET ORAL EVERY 8 HOURS SCHEDULED
Status: DISCONTINUED | OUTPATIENT
Start: 2024-02-01 | End: 2024-02-01

## 2024-01-31 RX ORDER — SODIUM CHLORIDE 9 MG/ML
INJECTION, SOLUTION INTRAVENOUS PRN
Status: DISCONTINUED | OUTPATIENT
Start: 2024-01-31 | End: 2024-02-02 | Stop reason: HOSPADM

## 2024-01-31 RX ORDER — DIPHENHYDRAMINE HYDROCHLORIDE 50 MG/ML
25 INJECTION INTRAMUSCULAR; INTRAVENOUS EVERY 6 HOURS PRN
Status: ACTIVE | OUTPATIENT
Start: 2024-01-31 | End: 2024-02-01

## 2024-01-31 RX ORDER — IBUPROFEN 400 MG/1
800 TABLET ORAL EVERY 8 HOURS SCHEDULED
Status: DISCONTINUED | OUTPATIENT
Start: 2024-01-31 | End: 2024-01-31

## 2024-01-31 RX ORDER — ONDANSETRON 2 MG/ML
4 INJECTION INTRAMUSCULAR; INTRAVENOUS EVERY 6 HOURS PRN
Status: ACTIVE | OUTPATIENT
Start: 2024-01-31 | End: 2024-02-01

## 2024-01-31 RX ORDER — ACETAMINOPHEN 325 MG/1
650 TABLET ORAL EVERY 6 HOURS
Status: DISPENSED | OUTPATIENT
Start: 2024-01-31 | End: 2024-02-01

## 2024-01-31 RX ORDER — ACETAMINOPHEN 500 MG
1000 TABLET ORAL EVERY 8 HOURS SCHEDULED
Status: DISCONTINUED | OUTPATIENT
Start: 2024-01-31 | End: 2024-01-31 | Stop reason: DRUGHIGH

## 2024-01-31 RX ORDER — DOCUSATE SODIUM 100 MG/1
100 CAPSULE, LIQUID FILLED ORAL 2 TIMES DAILY
Status: DISCONTINUED | OUTPATIENT
Start: 2024-01-31 | End: 2024-02-02 | Stop reason: HOSPADM

## 2024-01-31 RX ORDER — SODIUM CHLORIDE, SODIUM LACTATE, POTASSIUM CHLORIDE, CALCIUM CHLORIDE 600; 310; 30; 20 MG/100ML; MG/100ML; MG/100ML; MG/100ML
INJECTION, SOLUTION INTRAVENOUS CONTINUOUS
Status: DISCONTINUED | OUTPATIENT
Start: 2024-01-31 | End: 2024-02-02 | Stop reason: HOSPADM

## 2024-01-31 RX ORDER — ONDANSETRON 4 MG/1
4 TABLET, ORALLY DISINTEGRATING ORAL EVERY 8 HOURS PRN
Status: DISCONTINUED | OUTPATIENT
Start: 2024-01-31 | End: 2024-02-02 | Stop reason: HOSPADM

## 2024-01-31 RX ORDER — NALOXONE HYDROCHLORIDE 0.4 MG/ML
INJECTION, SOLUTION INTRAMUSCULAR; INTRAVENOUS; SUBCUTANEOUS PRN
Status: ACTIVE | OUTPATIENT
Start: 2024-01-31 | End: 2024-02-01

## 2024-01-31 RX ORDER — IBUPROFEN 400 MG/1
800 TABLET ORAL EVERY 8 HOURS SCHEDULED
Status: DISCONTINUED | OUTPATIENT
Start: 2024-02-01 | End: 2024-02-01

## 2024-01-31 RX ORDER — SODIUM CHLORIDE 0.9 % (FLUSH) 0.9 %
10 SYRINGE (ML) INJECTION PRN
Status: DISCONTINUED | OUTPATIENT
Start: 2024-01-31 | End: 2024-01-31

## 2024-01-31 RX ORDER — KETOROLAC TROMETHAMINE 30 MG/ML
30 INJECTION, SOLUTION INTRAMUSCULAR; INTRAVENOUS EVERY 6 HOURS
Status: DISPENSED | OUTPATIENT
Start: 2024-01-31 | End: 2024-02-01

## 2024-01-31 RX ORDER — DIPHENHYDRAMINE HCL 25 MG
25 CAPSULE ORAL EVERY 6 HOURS PRN
Status: ACTIVE | OUTPATIENT
Start: 2024-01-31 | End: 2024-02-01

## 2024-01-31 RX ORDER — ONDANSETRON 2 MG/ML
4 INJECTION INTRAMUSCULAR; INTRAVENOUS EVERY 6 HOURS PRN
Status: DISCONTINUED | OUTPATIENT
Start: 2024-01-31 | End: 2024-02-02 | Stop reason: HOSPADM

## 2024-01-31 RX ORDER — ONDANSETRON 2 MG/ML
INJECTION INTRAMUSCULAR; INTRAVENOUS PRN
Status: DISCONTINUED | OUTPATIENT
Start: 2024-01-31 | End: 2024-01-31 | Stop reason: SDUPTHER

## 2024-01-31 RX ADMIN — ACETAMINOPHEN 650 MG: 325 TABLET ORAL at 22:16

## 2024-01-31 RX ADMIN — SODIUM CHLORIDE, POTASSIUM CHLORIDE, SODIUM LACTATE AND CALCIUM CHLORIDE: 600; 310; 30; 20 INJECTION, SOLUTION INTRAVENOUS at 08:32

## 2024-01-31 RX ADMIN — SODIUM CHLORIDE, POTASSIUM CHLORIDE, SODIUM LACTATE AND CALCIUM CHLORIDE 1000 ML: 600; 310; 30; 20 INJECTION, SOLUTION INTRAVENOUS at 07:13

## 2024-01-31 RX ADMIN — LIDOCAINE HYDROCHLORIDE AND EPINEPHRINE 3 ML: 20; 5 INJECTION, SOLUTION EPIDURAL; INFILTRATION; INTRACAUDAL; PERINEURAL at 08:59

## 2024-01-31 RX ADMIN — OXYTOCIN 87.3 MILLI-UNITS/MIN: 10 INJECTION INTRAVENOUS at 10:01

## 2024-01-31 RX ADMIN — SODIUM CHLORIDE, POTASSIUM CHLORIDE, SODIUM LACTATE AND CALCIUM CHLORIDE: 600; 310; 30; 20 INJECTION, SOLUTION INTRAVENOUS at 09:36

## 2024-01-31 RX ADMIN — LIDOCAINE HYDROCHLORIDE AND EPINEPHRINE 5 ML: 20; 5 INJECTION, SOLUTION EPIDURAL; INFILTRATION; INTRACAUDAL; PERINEURAL at 08:55

## 2024-01-31 RX ADMIN — MORPHINE SULFATE 0.25 MG: 0.5 INJECTION, SOLUTION EPIDURAL; INTRATHECAL; INTRAVENOUS at 08:41

## 2024-01-31 RX ADMIN — WATER 2000 MG: 1 INJECTION INTRAMUSCULAR; INTRAVENOUS; SUBCUTANEOUS at 08:36

## 2024-01-31 RX ADMIN — FAMOTIDINE 20 MG: 10 INJECTION, SOLUTION INTRAVENOUS at 08:16

## 2024-01-31 RX ADMIN — PHENYLEPHRINE HYDROCHLORIDE 20 MCG/MIN: 10 INJECTION INTRAVENOUS at 08:37

## 2024-01-31 RX ADMIN — SODIUM CHLORIDE, POTASSIUM CHLORIDE, SODIUM LACTATE AND CALCIUM CHLORIDE: 600; 310; 30; 20 INJECTION, SOLUTION INTRAVENOUS at 18:47

## 2024-01-31 RX ADMIN — DOCUSATE SODIUM 100 MG: 100 CAPSULE, LIQUID FILLED ORAL at 22:16

## 2024-01-31 RX ADMIN — LIDOCAINE HYDROCHLORIDE AND EPINEPHRINE 3 ML: 20; 5 INJECTION, SOLUTION EPIDURAL; INFILTRATION; INTRACAUDAL; PERINEURAL at 09:02

## 2024-01-31 RX ADMIN — ACETAMINOPHEN 1000 MG: 325 TABLET ORAL at 12:30

## 2024-01-31 RX ADMIN — OXYCODONE HYDROCHLORIDE 5 MG: 5 TABLET ORAL at 15:54

## 2024-01-31 RX ADMIN — SODIUM CHLORIDE, POTASSIUM CHLORIDE, SODIUM LACTATE AND CALCIUM CHLORIDE: 600; 310; 30; 20 INJECTION, SOLUTION INTRAVENOUS at 10:00

## 2024-01-31 RX ADMIN — KETOROLAC TROMETHAMINE 30 MG: 30 INJECTION INTRAMUSCULAR; INTRAVENOUS at 18:20

## 2024-01-31 RX ADMIN — ONDANSETRON HYDROCHLORIDE 4 MG: 2 INJECTION, SOLUTION INTRAMUSCULAR; INTRAVENOUS at 08:36

## 2024-01-31 RX ADMIN — KETOROLAC TROMETHAMINE 30 MG: 30 INJECTION, SOLUTION INTRAMUSCULAR; INTRAVENOUS at 09:39

## 2024-01-31 RX ADMIN — OXYTOCIN 30 UNITS: 10 INJECTION, SOLUTION INTRAMUSCULAR; INTRAVENOUS at 09:09

## 2024-01-31 RX ADMIN — BUPIVACAINE HYDROCHLORIDE IN DEXTROSE 1.5 ML: 7.5 INJECTION, SOLUTION SUBARACHNOID at 08:41

## 2024-01-31 ASSESSMENT — PAIN SCALES - GENERAL
PAINLEVEL_OUTOF10: 1
PAINLEVEL_OUTOF10: 0
PAINLEVEL_OUTOF10: 2

## 2024-01-31 ASSESSMENT — PAIN DESCRIPTION - LOCATION
LOCATION: INCISION
LOCATION: ABDOMEN

## 2024-01-31 ASSESSMENT — PAIN - FUNCTIONAL ASSESSMENT: PAIN_FUNCTIONAL_ASSESSMENT: ACTIVITIES ARE NOT PREVENTED

## 2024-01-31 ASSESSMENT — PAIN DESCRIPTION - ORIENTATION
ORIENTATION: LOWER
ORIENTATION: LOWER;MID

## 2024-01-31 ASSESSMENT — PAIN DESCRIPTION - DESCRIPTORS
DESCRIPTORS: ACHING;CRAMPING
DESCRIPTORS: ACHING

## 2024-01-31 NOTE — ANESTHESIA PRE PROCEDURE
Department of Anesthesiology  Preprocedure Note       Name:  Jasson Corey   Age:  38 y.o.  :  1985                                          MRN:  [id number]         Date:  2024      Surgeon: Surgeon(s):  Nena Paul MD    Procedure: Procedure(s):   SECTION    Medications prior to admission:   Prior to Admission medications    Not on File       Current medications:    No current facility-administered medications for this encounter.     No current outpatient medications on file.       Allergies:  Not on File    Problem List:    Patient Active Problem List   Diagnosis Code   • History of  delivery Z98.891   • Unfavorable cervix in term pregnancy O34.40       Past Medical History:        Diagnosis Date   • Elevated prolactin level    • History of methicillin resistant staphylococcus aureus (MRSA)    • Postpartum depression        Past Surgical History:        Procedure Laterality Date   •  SECTION     • WISDOM TOOTH EXTRACTION         Social History:    Social History     Tobacco Use   • Smoking status: Not on file   • Smokeless tobacco: Not on file   Substance Use Topics   • Alcohol use: Not on file                                Counseling given: Not Answered      Vital Signs (Current): There were no vitals filed for this visit.                                           BP Readings from Last 3 Encounters:   No data found for BP       NPO Status:                                                                                 BMI:   Wt Readings from Last 3 Encounters:   No data found for Wt     There is no height or weight on file to calculate BMI.    CBC: No results found for: \"WBC\", \"RBC\", \"HGB\", \"HCT\", \"MCV\", \"RDW\", \"PLT\"    CMP: No results found for: \"NA\", \"K\", \"CL\", \"CO2\", \"BUN\", \"CREATININE\", \"GFRAA\", \"AGRATIO\", \"LABGLOM\", \"GLUCOSE\", \"GLU\", \"PROT\", \"CALCIUM\", \"BILITOT\", \"ALKPHOS\", \"AST\", \"ALT\"    POC Tests: No results for input(s): \"POCGLU\", \"POCNA\", \"POCK\",

## 2024-01-31 NOTE — ADDENDUM NOTE
Addendum  created 01/31/24 1024 by Aleksey Dhillon MD    Order list changed, Pharmacy for encounter modified

## 2024-01-31 NOTE — L&D DELIVERY NOTE
Scheduled RLTCS uncomplicated. EBL 600cc. See op note.    NICANOR Corey [[id number]]      Labor Events     Labor: No   Steroids: None  Cervical Ripening Date/Time:      Antibiotics Received during Labor: Yes  Rupture Date/Time:  24 09:07:00   Rupture Type: AROM  Fluid Color: Clear  Fluid Odor: None  Fluid Volume: Moderate  Induction: None  Augmentation: None  Labor Complications: None              Anesthesia    Method: Spinal       Delivery Details      Delivery Date: 24 Delivery Time: 09:08:00   Delivery Type: , Low Transverse  Trial of Labor?: No   Categorization: Repeat   Priority: scheduled  Indications for : Prior Uterine Surgery       Skin Incision Type: Pfannenstiel  Uterine Incision: Low Transverse        Presentation    Presentation: Vertex  _: Occiput  _: Anterior       Shoulder Dystocia    Shoulder Dystocia Present?: No       Assisted Delivery Details    Forceps Attempted?: No  Vacuum Extractor Attempted?: No                           Cord    Vessels: 3 Vessels  Complications: Wrapped  Cord Around: Shoulders  Delayed Cord Clamping?: Yes  Cord Clamped Date/Time: 2024 09:08:30  Cord Blood Disposition: Discard  Gases Sent?: No              Placenta    Date/Time: 2024 09:09:00  Removal: Manual Removal  Appearance: Intact  Disposition: Discarded       Lacerations    Episiotomy: None  Perineal Lacerations: None  Other Lacerations: no non-perineal laceration  Number of Repair Packets: 0       Blood Loss  Mother: Jasson Corey #[id number]     Start of Mother's Information      Delivery Blood Loss  24 0832 - 24 0950      None                 End of Mother's Information  Mother: Jasson Corey #[id number]                Delivery Providers    Delivering clinician: Nena Paul MD     Provider Role    Nena Paul MD Obstetrician    Brooke Sood RN Primary Nurse    Qing Hamlin RN Primary

## 2024-01-31 NOTE — LACTATION NOTE
Discussed with mother her plan for feeding.  Reviewed the benefits of exclusive breast milk feeding during the hospital stay.  Informed mother of the risks of using formula to supplement in the first few days of life as well as the benefits of successful breast milk feeding. Mother acknowledges understanding of information reviewed and states that it is her plan to breast milk and formula feed her infant.  Will support her choice and offer additional information as needed.

## 2024-01-31 NOTE — ADDENDUM NOTE
Addendum  created 01/31/24 1015 by Aleksey Dhillon MD    Review and Sign - Ready for Procedure, Review and Sign - Signed

## 2024-01-31 NOTE — ANESTHESIA PROCEDURE NOTES
CSE Block    Patient location during procedure: OR  Start time: 1/31/2024 8:32 AM  End time: 1/31/2024 8:41 AM  Reason for block: primary anesthetic  Staffing  Performed: anesthesiologist   Anesthesiologist: Aleksey Dhillon MD  Performed by: Aleksey Dhillon MD  Authorized by: Aleksey Dhillon MD    CSE  Patient position: sitting  Prep: ChloraPrep  Patient monitoring: continuous pulse ox and frequent blood pressure checks  Approach: midline  Provider prep: mask and sterile gloves  Spinal Needle  Needle type: pencil-tip   Needle gauge: 25 G  Needle length: 6 in  Epidural Needle  Injection technique: ASAEL saline  Needle type: Tuohy   Needle gauge: 17 G  Needle length: 6 in  Needle insertion depth: 6 cm  Location: lumbar (1-5)  Catheter  Catheter type: end hole  Catheter size: 19 G  Catheter at skin depth: 10 cm  Test dose: negative  [id number]  Hemodynamics: stable  Additional Notes  1.5 mL 0.75% Bupivacaine + 0.25 mg Duramorph deposited into CSF.  Preanesthetic Checklist  Completed: patient identified, IV checked, site marked, risks and benefits discussed, surgical/procedural consents, equipment checked, pre-op evaluation, timeout performed, anesthesia consent given, oxygen available, monitors applied/VS acknowledged, fire risk safety assessment completed and verbalized and blood product R/B/A discussed and consented

## 2024-01-31 NOTE — PROGRESS NOTES
0715: Bedside and Verbal shift change report given to PEDRO Sood, RN (oncoming nurse) by ANTON Tenorio RN (offgoing nurse). Report included the following information Nurse Handoff Report.     0832: Pt escorted to OR by this RN.     0908: Birth of live baby girl via LTCS, delivered by Dr. Paul.     0940: Postpartum recovery started.

## 2024-01-31 NOTE — H&P
History & Physical    Name: Jasson Corey MRN: [id number]  SSN: [id number]    YOB: 1985  Age: 38 y.o.  Sex: female      Subjective:     Chief Complaint:  Pregnancy and prior  x2.    Estimated Date of Delivery: 24  OB History    Para Term  AB Living   4 2 2   1 2   SAB IAB Ectopic Molar Multiple Live Births   1         2      # Outcome Date GA Lbr Juan/2nd Weight Sex Delivery Anes PTL Lv   4 Current            3 SAB 22           2 Term 17 39w6d  3.375 kg (7 lb 7.1 oz) F CS-LTranv  N DESTINI   1 Term 04/10/14 37w4d  2.31 kg (5 lb 1.5 oz) F CS-LTranv  N DESTINI      Complications: Fetal Intolerance       Ms. Corey admitted with pregnancy at 39w1d for  section due to prior cs x 2. Prenatal course was complicated by:  history of  section  x 2 desires repeat  endocrine, nutritional and metabolic disease complicating pregnancy, childbirth and puerperium  elevated prolactin - has endo, will let them know she is pregnant  paternal age  nml 28 week scan  glucose tolerance test outside reference range  1 hr gtt 141, 3 hr gtt _(all nml)_  multiparous  advanced maternal age   NIPT_neg__AFP_nml__FS__nml__ Fetal scan; Growth q4 weeks; BPP weekly at 36 wks; delivery by 40wks  postpartum depression  history of methicillin resistant Staphylococcus aureus infection  mrsa___1rst_neg__2nd___3rd_(neg)__  COVID-19  dx 36 weeks, sp paxlovid    Please see prenatal records which have also been sent to Labor and Delivery and added to Epic for details, dated 23 to current    Past Medical History:   Diagnosis Date    Elevated prolactin level     History of methicillin resistant staphylococcus aureus (MRSA)     Postpartum depression      Past Surgical History:   Procedure Laterality Date     SECTION      WISDOM TOOTH EXTRACTION       Social History     Occupational History    Not on file   Tobacco Use    Smoking status: Former     Types: Cigarettes    Smokeless

## 2024-01-31 NOTE — ANESTHESIA POSTPROCEDURE EVALUATION
Department of Anesthesiology  Postprocedure Note    Patient: Jasson Corey  MRN: [id number]  YOB: 1985  Date of evaluation: 2024    Procedure Summary       Date: 24 Room / Location: [facility] L&D 02 / [facility] L&D OR    Anesthesia Start: 832 Anesthesia Stop: 953    Procedure:  SECTION (Abdomen) Diagnosis:       Delivery with history of       (Delivery with history of  [O34.219])    Surgeons: Nena Paul MD Responsible Provider: Aleksey Dhillon MD    Anesthesia Type: CSE, spinal ASA Status: 2            Anesthesia Type: No value filed.    Bertram Phase I:      Bertram Phase II:      Anesthesia Post Evaluation    Patient location during evaluation: PACU  Patient participation: complete - patient participated  Level of consciousness: awake and alert  Pain score: 0  Airway patency: patent  Nausea & Vomiting: no vomiting and no nausea  Cardiovascular status: blood pressure returned to baseline and hemodynamically stable  Respiratory status: acceptable  Hydration status: stable  Pain management: adequate and satisfactory to patient    No notable events documented.

## 2024-01-31 NOTE — OP NOTE
running locking fashion, followed by a second imbricating layer of 0 monocryl. Good hemostasis was again reassured and the Ashu retractor was removed. There was noted to be some oozing from the left rectus muscle belly. This was cauterized and surgicel powder was placed. Hemostasis was assured. The fascia was closed with 0 Vicryl in a running fashion. Good hemostasis was assured. The subcutaneous tissue was irrigated with warm saline and made hemostatic with the bovie cautery. The subcutaneous tissue was closed with 2-0 plain gut in a running fashion.  The skin was closed with the Insorb subcuticular closure device followed by dermabond. The patient tolerated the procedure well. Sponge, lap, and needle counts were correct times three and the patient and baby were taken to recovery/postpartum room in stable condition.    Nena Paul MD  January 31, 2024  9:42 AM

## 2024-01-31 NOTE — PROGRESS NOTES
0625: Jasson Corey is a 38 y.o.  at 39w1d patient of Dr Paul at [facility] who presents to L&D for scheduled  section. SShe reports Positive FM, denies vaginal bleeding, LOF, and contractions. She also denies Headaches, Scotoma, RUQ pain, and Edema. Urine sample obtained. EFM and toco placed for initial assessment.    0710: Bedside and Verbal shift change report given to PEDRO Sood RN (oncoming nurse) by ANTON Tenorio RN (offgoing nurse). Report included the following information Nurse Handoff Report.

## 2024-02-01 LAB
ERYTHROCYTE [DISTWIDTH] IN BLOOD BY AUTOMATED COUNT: 13.5 % (ref 11.5–14.5)
HCT VFR BLD AUTO: 29.9 % (ref 35–47)
HGB BLD-MCNC: 9.5 G/DL (ref 11.5–16)
MCH RBC QN AUTO: 28.2 PG (ref 26–34)
MCHC RBC AUTO-ENTMCNC: 31.8 G/DL (ref 30–36.5)
MCV RBC AUTO: 88.7 FL (ref 80–99)
NRBC # BLD: 0 K/UL (ref 0–0.01)
NRBC BLD-RTO: 0 PER 100 WBC
PLATELET # BLD AUTO: 126 K/UL (ref 150–400)
PMV BLD AUTO: 12 FL (ref 8.9–12.9)
RBC # BLD AUTO: 3.37 M/UL (ref 3.8–5.2)
WBC # BLD AUTO: 7.7 K/UL (ref 3.6–11)

## 2024-02-01 PROCEDURE — 36415 COLL VENOUS BLD VENIPUNCTURE: CPT

## 2024-02-01 PROCEDURE — 6370000000 HC RX 637 (ALT 250 FOR IP): Performed by: OBSTETRICS & GYNECOLOGY

## 2024-02-01 PROCEDURE — 6360000002 HC RX W HCPCS: Performed by: ANESTHESIOLOGY

## 2024-02-01 PROCEDURE — 2580000003 HC RX 258: Performed by: OBSTETRICS & GYNECOLOGY

## 2024-02-01 PROCEDURE — 6370000000 HC RX 637 (ALT 250 FOR IP): Performed by: ANESTHESIOLOGY

## 2024-02-01 PROCEDURE — 1120000000 HC RM PRIVATE OB

## 2024-02-01 PROCEDURE — 85027 COMPLETE CBC AUTOMATED: CPT

## 2024-02-01 RX ORDER — IBUPROFEN 400 MG/1
800 TABLET ORAL EVERY 8 HOURS SCHEDULED
Status: DISCONTINUED | OUTPATIENT
Start: 2024-02-01 | End: 2024-02-02 | Stop reason: HOSPADM

## 2024-02-01 RX ORDER — ACETAMINOPHEN 500 MG
1000 TABLET ORAL EVERY 8 HOURS SCHEDULED
Status: DISCONTINUED | OUTPATIENT
Start: 2024-02-01 | End: 2024-02-02 | Stop reason: HOSPADM

## 2024-02-01 RX ADMIN — OXYCODONE HYDROCHLORIDE 5 MG: 5 TABLET ORAL at 16:43

## 2024-02-01 RX ADMIN — OXYCODONE HYDROCHLORIDE 5 MG: 5 TABLET ORAL at 12:26

## 2024-02-01 RX ADMIN — OXYCODONE HYDROCHLORIDE 5 MG: 5 TABLET ORAL at 20:48

## 2024-02-01 RX ADMIN — SODIUM CHLORIDE, POTASSIUM CHLORIDE, SODIUM LACTATE AND CALCIUM CHLORIDE: 600; 310; 30; 20 INJECTION, SOLUTION INTRAVENOUS at 03:15

## 2024-02-01 RX ADMIN — IBUPROFEN 800 MG: 400 TABLET, FILM COATED ORAL at 19:24

## 2024-02-01 RX ADMIN — ACETAMINOPHEN 1000 MG: 500 TABLET ORAL at 22:20

## 2024-02-01 RX ADMIN — SODIUM CHLORIDE, PRESERVATIVE FREE 10 ML: 5 INJECTION INTRAVENOUS at 09:57

## 2024-02-01 RX ADMIN — DOCUSATE SODIUM 100 MG: 100 CAPSULE, LIQUID FILLED ORAL at 09:57

## 2024-02-01 RX ADMIN — ACETAMINOPHEN 1000 MG: 500 TABLET ORAL at 14:37

## 2024-02-01 RX ADMIN — DOCUSATE SODIUM 100 MG: 100 CAPSULE, LIQUID FILLED ORAL at 20:48

## 2024-02-01 RX ADMIN — KETOROLAC TROMETHAMINE 30 MG: 30 INJECTION INTRAMUSCULAR; INTRAVENOUS at 01:03

## 2024-02-01 RX ADMIN — ACETAMINOPHEN 650 MG: 325 TABLET ORAL at 05:13

## 2024-02-01 RX ADMIN — IBUPROFEN 800 MG: 400 TABLET, FILM COATED ORAL at 09:57

## 2024-02-01 ASSESSMENT — PAIN SCALES - GENERAL
PAINLEVEL_OUTOF10: 8
PAINLEVEL_OUTOF10: 5
PAINLEVEL_OUTOF10: 9
PAINLEVEL_OUTOF10: 1
PAINLEVEL_OUTOF10: 5
PAINLEVEL_OUTOF10: 1

## 2024-02-01 ASSESSMENT — PAIN DESCRIPTION - LOCATION
LOCATION: ABDOMEN
LOCATION: ABDOMEN
LOCATION: INCISION
LOCATION: ABDOMEN;INCISION
LOCATION: ABDOMEN
LOCATION: ABDOMEN;INCISION

## 2024-02-01 ASSESSMENT — PAIN DESCRIPTION - ORIENTATION
ORIENTATION: LOWER
ORIENTATION: ANTERIOR;LOWER
ORIENTATION: LOWER;MID
ORIENTATION: LOWER;MID

## 2024-02-01 ASSESSMENT — PAIN DESCRIPTION - DESCRIPTORS
DESCRIPTORS: ACHING;CRAMPING
DESCRIPTORS: ACHING;SORE
DESCRIPTORS: ACHING;CRAMPING
DESCRIPTORS: ACHING;SORE;SHARP
DESCRIPTORS: SORE;SHARP

## 2024-02-01 ASSESSMENT — PAIN - FUNCTIONAL ASSESSMENT
PAIN_FUNCTIONAL_ASSESSMENT: ACTIVITIES ARE NOT PREVENTED
PAIN_FUNCTIONAL_ASSESSMENT: PREVENTS OR INTERFERES SOME ACTIVE ACTIVITIES AND ADLS

## 2024-02-01 NOTE — LACTATION NOTE
This note was copied from a baby's chart.     24 1600   Visit Information   Lactation Consult Visit Type IP Initial Consult   Visit Length 30 minutes   Reason for Visit Normal  Visit;Education   Breast Feeding History/Assessment   Left Breast Firm  (Colostrum expressed)   Left Nipple Protrude   Right Nipple Protrude   Right Breast Firm  (Colostrum expressed)   Breastfeeding History Yes  (Provided breast milk for previous 2 babies (now 6 and 10yrs))   Feeding Assessment: Infant Factors   Infant Supplementation Formula    Formula Type Similac 360 Total Care   Care Plan/Breast Care   Breast Care Lanolin provided;Pumping supply provided  (Pumping initiated)   Lactation Comment Baby is about 30 hours old. Mother is doing some breastfeeding. Supplementing with formula. She does desire to provided breast milk. Pumping initiated.  Equipment: Medela PS from previous baby; New Freemie Indepenence  Provided pacifier education     Reviewed the \"Your Guide to Breastfeeding\" booklet. Discussed the typical feeding characteristics in the 1st and 2nd DOL and signs of adequate intake.  Discussed a feeding plan and mother's questions were addressed.     Plan:  Offer lots of skin to skin and access to the breast.  Feed baby at early signs of hunger every 2-3 hours.  Assure a deep latch, check that baby's lips are turned outward and use breast compression to keep baby actively feeding.  Supplement if mother desires.  Pump for 15 minutes.  Monitor wet and dirty diapers for signs of adequate intake.

## 2024-02-01 NOTE — PROGRESS NOTES
Duramorph Follow-Up Note    1 Day Post-Op sp Procedure(s):   SECTION.    /64   Pulse 57   Temp 97.7 °F (36.5 °C) (Oral)   Resp 16   Ht 1.664 m (5' 5.5\")   Wt 94.8 kg (209 lb)   SpO2 98%   Breastfeeding Unknown   BMI 34.25 kg/m² .      Patient is POD-1 S/P intrathecal duramorph.  Pain is well controlled  Patient reports no headache, fever, weakness or numbness.  Epidural/spinal tap site is clean, dry and intact.  No obvious Anesthesia complications noted.    Plan:    Pain management as per primary service.

## 2024-02-01 NOTE — PROGRESS NOTES
Post-Operative  Day 1    Jasson Corey     Assessment: Post-Op day 1, stable s/p scheduled RLTCS.  Hgb 11.6 > 9.5.    Plan:     - Routine post-operative care.  - N/A   - Postop hemoglobin stable.  Plan to start Fe at discharge if pt anemic.  - Ambulate today.      Information for the patient's :  Madhav, GIRL Jasson [[id number]]   , Low Transverse   Patient doing well without significant complaint.  Tolerating diet.  Wilkinson out.  Ambulating.      Vitals:  BP (!) 112/53   Pulse 56   Temp 98.4 °F (36.9 °C) (Oral)   Resp 16   Ht 1.664 m (5' 5.5\")   Wt 94.8 kg (209 lb)   SpO2 97%   Breastfeeding Unknown   BMI 34.25 kg/m²   Temp (24hrs), Av.4 °F (36.9 °C), Min:97.7 °F (36.5 °C), Max:99.2 °F (37.3 °C)      Last 24hr Input/Output:    Intake/Output Summary (Last 24 hours) at 2024 1449  Last data filed at 2024 1210  Gross per 24 hour   Intake 10 ml   Output 1550 ml   Net -1540 ml          Exam:     Patient without distress.               Fundus firm, nontender per nursing fundal checks.  Incision bandaged, clean, dry, intact.              Perineum with normal lochia noted per nursing assessment.              Lower extremities are negative for pathological edema.    Labs:   Lab Results   Component Value Date/Time    WBC 7.7 2024 06:44 AM    WBC 9.1 2024 06:53 AM    HGB 9.5 2024 06:44 AM    HGB 11.6 2024 06:53 AM    HCT 29.9 2024 06:44 AM    HCT 34.7 2024 06:53 AM     2024 06:44 AM     2024 06:53 AM       Recent Results (from the past 24 hour(s))   CBC    Collection Time: 24  6:44 AM   Result Value Ref Range    WBC 7.7 3.6 - 11.0 K/uL    RBC 3.37 (L) 3.80 - 5.20 M/uL    Hemoglobin 9.5 (L) 11.5 - 16.0 g/dL    Hematocrit 29.9 (L) 35.0 - 47.0 %    MCV 88.7 80.0 - 99.0 FL    MCH 28.2 26.0 - 34.0 PG    MCHC 31.8 30.0 - 36.5 g/dL    RDW 13.5 11.5 - 14.5 %    Platelets 126 (L) 150 - 400 K/uL    MPV 12.0 8.9 - 12.9 FL

## 2024-02-02 VITALS
SYSTOLIC BLOOD PRESSURE: 121 MMHG | RESPIRATION RATE: 18 BRPM | HEART RATE: 64 BPM | TEMPERATURE: 98.6 F | WEIGHT: 209 LBS | BODY MASS INDEX: 33.59 KG/M2 | OXYGEN SATURATION: 98 % | DIASTOLIC BLOOD PRESSURE: 63 MMHG | HEIGHT: 66 IN

## 2024-02-02 PROCEDURE — 6370000000 HC RX 637 (ALT 250 FOR IP): Performed by: OBSTETRICS & GYNECOLOGY

## 2024-02-02 RX ORDER — IBUPROFEN 800 MG/1
800 TABLET ORAL EVERY 8 HOURS SCHEDULED
Qty: 30 TABLET | Refills: 3 | Status: SHIPPED | OUTPATIENT
Start: 2024-02-02

## 2024-02-02 RX ORDER — OXYCODONE HYDROCHLORIDE 5 MG/1
5 TABLET ORAL EVERY 4 HOURS PRN
Qty: 12 TABLET | Refills: 0 | Status: SHIPPED | OUTPATIENT
Start: 2024-02-02 | End: 2024-02-05

## 2024-02-02 RX ADMIN — DOCUSATE SODIUM 100 MG: 100 CAPSULE, LIQUID FILLED ORAL at 10:38

## 2024-02-02 RX ADMIN — OXYCODONE HYDROCHLORIDE 5 MG: 5 TABLET ORAL at 06:35

## 2024-02-02 RX ADMIN — IBUPROFEN 800 MG: 400 TABLET, FILM COATED ORAL at 10:38

## 2024-02-02 RX ADMIN — ACETAMINOPHEN 1000 MG: 500 TABLET ORAL at 06:35

## 2024-02-02 RX ADMIN — OXYCODONE HYDROCHLORIDE 5 MG: 5 TABLET ORAL at 00:51

## 2024-02-02 RX ADMIN — OXYCODONE HYDROCHLORIDE 5 MG: 5 TABLET ORAL at 10:38

## 2024-02-02 RX ADMIN — IBUPROFEN 800 MG: 400 TABLET, FILM COATED ORAL at 03:46

## 2024-02-02 ASSESSMENT — PAIN DESCRIPTION - ORIENTATION
ORIENTATION: LOWER
ORIENTATION: ANTERIOR;LOWER
ORIENTATION: ANTERIOR;LOWER

## 2024-02-02 ASSESSMENT — PAIN SCALES - GENERAL
PAINLEVEL_OUTOF10: 5
PAINLEVEL_OUTOF10: 4
PAINLEVEL_OUTOF10: 2

## 2024-02-02 ASSESSMENT — PAIN - FUNCTIONAL ASSESSMENT
PAIN_FUNCTIONAL_ASSESSMENT: ACTIVITIES ARE NOT PREVENTED
PAIN_FUNCTIONAL_ASSESSMENT: PREVENTS OR INTERFERES SOME ACTIVE ACTIVITIES AND ADLS
PAIN_FUNCTIONAL_ASSESSMENT: ACTIVITIES ARE NOT PREVENTED

## 2024-02-02 ASSESSMENT — PAIN DESCRIPTION - DESCRIPTORS
DESCRIPTORS: SORE
DESCRIPTORS: ACHING
DESCRIPTORS: SORE

## 2024-02-02 ASSESSMENT — PAIN DESCRIPTION - LOCATION
LOCATION: INCISION

## 2024-02-02 NOTE — DISCHARGE SUMMARY
Obstetrical Discharge Summary     Name: Jasson Corey MRN: [id number]  SSN: [id number]    YOB: 1985  Age: 38 y.o.  Sex: female      Admit Date: 2024    Discharge Date: 2024     Admitting Physician: Nena Paul MD     Attending Physician:  Nena Paul, *     Admission Diagnoses: Delivery with history of  [O34.219]  Pregnancy as incidental finding [Z33.1]    Discharge Diagnoses:   Information for the patient's :  Madhav, NICANOR Spaulding [[id number]]   @[id number]@      Additional Diagnoses:  No components found for: \"OBEXTABORH\", \"OBEXTABSCRN\", \"OBEXTRUBELLA\", \"OBEXTGRBS\"    Hospital Course: Normal hospital course following the delivery.    Patient Instructions:   Current Discharge Medication List        CONTINUE these medications which have NOT CHANGED    Details   Prenatal Vit-Fe Fumarate-FA (PRENATAL PO) Take by mouth      aspirin 81 MG chewable tablet Take 1 tablet by mouth daily             Disposition at Discharge: Home or self care    Condition at Discharge: Stable    Reference my discharge instructions.    No follow-ups on file.     Signed By:  Elina Jensen MD     2024

## 2024-02-02 NOTE — LACTATION NOTE
This note was copied from a baby's chart.     24 1100   Visit Information   Lactation Consult Visit Type IP Consult Follow Up   Visit Length 15 minutes   Referral Received From Lactation Consultant Follow-up   Reason for Visit Normal  Visit;Education   Breast Feeding History/Assessment    Breast Declined   Feeding Assessment: Infant Factors   Infant Supplementation Expressed Breast Milk;Formula    Formula Type Similac 360 Total Care   Care Plan/Breast Care   Lactation Comment Mother preparing for discharge. States baby is breastfeeding. She is supplementing with Formula and doing some pumping. Encouarged mother to continue pumping as long as baby is requiring supplementation. Addressed questions.  Equipment: New Freemie; Previously used Medela PS; Medela hand pump; Measured for 24mm flanges   oral assessment  Provided pacifier education     Goals: 1) Feed baby, 2) Reach full milk supply, 3) Baby transfer well on the breast    Offer lots of skin to skin and access to the breast  Feed baby at early signs of hunger every 2-3 hours  Assure a deep latch, check that baby's lips are turned outward and use breast compression to keep baby actively feeding  Offer a supplement of pumped breast milk via paced bottle feeding  Pump breasts for 15 minutes  Monitor wet and dirty diapers for signs of adequate intake  Discuss feeding plan with Pediatrician and make adjustments as needed

## 2024-02-02 NOTE — PROGRESS NOTES
1230 Patient discharged. Discharge instructions and medications reviewed/given. Patient verbalizes understanding. All questions answered. No distress noted, patient stable. Patient confirmed will call to make appointment in 6 weeks with [facility].  Todd  Depression Screening Prior to Discharge:      EPDS screening completed.     I have completed education and provided available resources for postpartum depression to the patient. Patient has verbalized understanding of signs and symptoms to report and all questions answered.     Based on EPDS score of   Postpartum Depression: Low Risk  (2024)    Todd  Depression Scale     Last EPDS Total Score: 0     Last EPDS Self Harm Result: Never    patient has a scheduled follow-up appointment in 6 WEEKS WITH [facility].

## 2024-02-02 NOTE — DISCHARGE INSTRUCTIONS
Preeclampsia: Care Instructions  Preeclampsia is high blood pressure and signs of organ damage, usually after 20 weeks of pregnancy. If it's not managed, it can harm you or your baby and lead to dangerous seizures (eclampsia).    Most people with preeclampsia have healthy babies. Preeclampsia usually goes away in the weeks after birth.   In rare cases, symptoms of preeclampsia don't show up until days or weeks after childbirth.     Monitor yourself for symptoms of preeclampsia.  Call your doctor if you have symptoms such as a severe headache, vision changes, or sudden swelling in your face and hands.     Keep track of your blood pressure at home if your doctor asks you to.  Ask your doctor to make sure that the monitor is working and that you're using it right. Follow instructions about when to take your blood pressure and what to avoid before taking your blood pressure.     Take medicines exactly as prescribed.  You may need to take medicine to control your blood pressure.     Don't smoke.  If you smoke, quit or cut back as much as you can. If you need help quitting, talk to your doctor.     Gain a healthy amount of weight.  Talk with your doctor about how much weight gain is healthy for you. Gaining too much weight while you're pregnant may be harmful.   When should you call for help?  Share this information with your partner or a friend. They can help you watch for warning signs.  Call 911  anytime you think you may need emergency care. For example, call if:    You passed out (lost consciousness).     You have a seizure.     You have trouble breathing.     You have chest pain.   Call your doctor now or seek immediate medical care if:    You have symptoms of preeclampsia, such as:  Sudden swelling of your face, hands, or feet.  New vision problems (such as dimness, blurring, or seeing spots).  A severe headache.     Your blood pressure is very high, such as 160/110 or higher.     Your blood pressure is higher

## 2024-02-02 NOTE — PROGRESS NOTES
Post-Operative  Day 2    Jasson Corey     Assessment: Post-Op day 2, stable s/p scheduled RLTCS.  Hgb 11.6 > 9.5.    Plan:     - Routine post-operative care.  - N/A   - Postop hemoglobin stable.  Plan to start Fe at discharge.  - Ambulate today.      Information for the patient's :  Madhav, GIRL Jasson [[id number]]   , Low Transverse   Patient doing well without significant complaint.  Tolerating diet.  Wilkinson out.  Ambulating.      Vitals:  /63   Pulse 64   Temp 98.6 °F (37 °C) (Oral)   Resp 18   Ht 1.664 m (5' 5.5\")   Wt 94.8 kg (209 lb)   SpO2 98%   Breastfeeding Unknown   BMI 34.25 kg/m²   Temp (24hrs), Av.2 °F (36.8 °C), Min:97.9 °F (36.6 °C), Max:98.6 °F (37 °C)      Last 24hr Input/Output:    Intake/Output Summary (Last 24 hours) at 2024 1111  Last data filed at 2024 1747  Gross per 24 hour   Intake --   Output 700 ml   Net -700 ml            Exam:     Patient without distress.               Fundus firm, nontender per nursing fundal checks.  Incision bandaged, clean, dry, intact.              Perineum with normal lochia noted per nursing assessment.              Lower extremities are negative for pathological edema.    Labs:   Lab Results   Component Value Date/Time    WBC 7.7 2024 06:44 AM    WBC 9.1 2024 06:53 AM    HGB 9.5 2024 06:44 AM    HGB 11.6 2024 06:53 AM    HCT 29.9 2024 06:44 AM    HCT 34.7 2024 06:53 AM     2024 06:44 AM     2024 06:53 AM       No results found for this or any previous visit (from the past 24 hour(s)).    Elina Jensen MD

## (undated) DEVICE — TIP CLEANER: Brand: VALLEYLAB

## (undated) DEVICE — POOLE SUCTION INSTRUMENT WITH REMOVABLE SHEATH: Brand: POOLE

## (undated) DEVICE — LARGE, DISPOSABLE ALEXIS O C-SECTION PROTECTOR - RETRACTOR: Brand: ALEXIS ® O C-SECTION PROTECTOR - RETRACTOR

## (undated) DEVICE — SUTURE VCRL SZ 0 L36IN ABSRB VLT L40MM CT 1/2 CIR J358H

## (undated) DEVICE — SUTURE VCRL SZ 2-0 L36IN ABSRB VLT L36MM CT-1 1/2 CIR J345H

## (undated) DEVICE — REM POLYHESIVE ADULT PATIENT RETURN ELECTRODE: Brand: VALLEYLAB

## (undated) DEVICE — 3000CC GUARDIAN II: Brand: GUARDIAN

## (undated) DEVICE — SUTURE MCRYL SZ 0 L36IN ABSRB VLT L48MM CTX 1/2 CIR Y398H

## (undated) DEVICE — MEDI-VAC NON-CONDUCTIVE SUCTION TUBING: Brand: CARDINAL HEALTH

## (undated) DEVICE — PENCIL ES L3M BTTN SWCH S STL HEX LOK BLDE ELECTRD HOLSTER

## (undated) DEVICE — PACK PROCEDURE SURG C SECT KT SMH

## (undated) DEVICE — (D)PREP SKN CHLRAPRP APPL 26ML -- CONVERT TO ITEM 371833

## (undated) DEVICE — DEVON™ KNEE AND BODY STRAP 60" X 3" (1.5 M X 7.6 CM): Brand: DEVON

## (undated) DEVICE — SOLUTION IV 1000ML 0.9% SOD CHL

## (undated) DEVICE — SOLIDIFIER MEDC 1200ML -- CONVERT TO 356117

## (undated) DEVICE — STAPLER SKIN SQ 30 ABSRB STPL DISP INSORB